# Patient Record
Sex: MALE
[De-identification: names, ages, dates, MRNs, and addresses within clinical notes are randomized per-mention and may not be internally consistent; named-entity substitution may affect disease eponyms.]

---

## 2019-11-05 ENCOUNTER — HOSPITAL ENCOUNTER (INPATIENT)
Dept: HOSPITAL 90 - EDH | Age: 84
LOS: 11 days | Discharge: HOSPICE | DRG: 208 | End: 2019-11-16
Attending: INTERNAL MEDICINE | Admitting: INTERNAL MEDICINE
Payer: COMMERCIAL

## 2019-11-05 VITALS — SYSTOLIC BLOOD PRESSURE: 120 MMHG | DIASTOLIC BLOOD PRESSURE: 79 MMHG

## 2019-11-05 VITALS — SYSTOLIC BLOOD PRESSURE: 110 MMHG | DIASTOLIC BLOOD PRESSURE: 68 MMHG

## 2019-11-05 VITALS — DIASTOLIC BLOOD PRESSURE: 94 MMHG | SYSTOLIC BLOOD PRESSURE: 157 MMHG

## 2019-11-05 VITALS — SYSTOLIC BLOOD PRESSURE: 150 MMHG | DIASTOLIC BLOOD PRESSURE: 89 MMHG

## 2019-11-05 VITALS — DIASTOLIC BLOOD PRESSURE: 76 MMHG | SYSTOLIC BLOOD PRESSURE: 139 MMHG

## 2019-11-05 VITALS — DIASTOLIC BLOOD PRESSURE: 87 MMHG | SYSTOLIC BLOOD PRESSURE: 148 MMHG

## 2019-11-05 VITALS — DIASTOLIC BLOOD PRESSURE: 82 MMHG | SYSTOLIC BLOOD PRESSURE: 132 MMHG

## 2019-11-05 VITALS — SYSTOLIC BLOOD PRESSURE: 148 MMHG | DIASTOLIC BLOOD PRESSURE: 80 MMHG

## 2019-11-05 VITALS — DIASTOLIC BLOOD PRESSURE: 62 MMHG | SYSTOLIC BLOOD PRESSURE: 120 MMHG

## 2019-11-05 VITALS — SYSTOLIC BLOOD PRESSURE: 80 MMHG | DIASTOLIC BLOOD PRESSURE: 46 MMHG

## 2019-11-05 VITALS — DIASTOLIC BLOOD PRESSURE: 91 MMHG | SYSTOLIC BLOOD PRESSURE: 142 MMHG

## 2019-11-05 VITALS — SYSTOLIC BLOOD PRESSURE: 163 MMHG | DIASTOLIC BLOOD PRESSURE: 56 MMHG

## 2019-11-05 VITALS — DIASTOLIC BLOOD PRESSURE: 94 MMHG | SYSTOLIC BLOOD PRESSURE: 155 MMHG

## 2019-11-05 VITALS — DIASTOLIC BLOOD PRESSURE: 94 MMHG | SYSTOLIC BLOOD PRESSURE: 170 MMHG

## 2019-11-05 VITALS — SYSTOLIC BLOOD PRESSURE: 145 MMHG | DIASTOLIC BLOOD PRESSURE: 85 MMHG

## 2019-11-05 VITALS — DIASTOLIC BLOOD PRESSURE: 83 MMHG | SYSTOLIC BLOOD PRESSURE: 138 MMHG

## 2019-11-05 VITALS — DIASTOLIC BLOOD PRESSURE: 86 MMHG | SYSTOLIC BLOOD PRESSURE: 154 MMHG

## 2019-11-05 VITALS — DIASTOLIC BLOOD PRESSURE: 76 MMHG | SYSTOLIC BLOOD PRESSURE: 128 MMHG

## 2019-11-05 VITALS — BODY MASS INDEX: 21.23 KG/M2 | HEIGHT: 68 IN | WEIGHT: 140.1 LBS

## 2019-11-05 VITALS — DIASTOLIC BLOOD PRESSURE: 82 MMHG | SYSTOLIC BLOOD PRESSURE: 143 MMHG

## 2019-11-05 VITALS — SYSTOLIC BLOOD PRESSURE: 178 MMHG | DIASTOLIC BLOOD PRESSURE: 100 MMHG

## 2019-11-05 VITALS — DIASTOLIC BLOOD PRESSURE: 48 MMHG | SYSTOLIC BLOOD PRESSURE: 84 MMHG

## 2019-11-05 VITALS — DIASTOLIC BLOOD PRESSURE: 95 MMHG | SYSTOLIC BLOOD PRESSURE: 161 MMHG

## 2019-11-05 VITALS — SYSTOLIC BLOOD PRESSURE: 119 MMHG | DIASTOLIC BLOOD PRESSURE: 67 MMHG

## 2019-11-05 VITALS — DIASTOLIC BLOOD PRESSURE: 71 MMHG | SYSTOLIC BLOOD PRESSURE: 130 MMHG

## 2019-11-05 VITALS — SYSTOLIC BLOOD PRESSURE: 80 MMHG | DIASTOLIC BLOOD PRESSURE: 52 MMHG

## 2019-11-05 VITALS — DIASTOLIC BLOOD PRESSURE: 75 MMHG | SYSTOLIC BLOOD PRESSURE: 126 MMHG

## 2019-11-05 VITALS — SYSTOLIC BLOOD PRESSURE: 138 MMHG | DIASTOLIC BLOOD PRESSURE: 76 MMHG

## 2019-11-05 VITALS — SYSTOLIC BLOOD PRESSURE: 128 MMHG | DIASTOLIC BLOOD PRESSURE: 77 MMHG

## 2019-11-05 VITALS — SYSTOLIC BLOOD PRESSURE: 130 MMHG | DIASTOLIC BLOOD PRESSURE: 71 MMHG

## 2019-11-05 VITALS — DIASTOLIC BLOOD PRESSURE: 94 MMHG | SYSTOLIC BLOOD PRESSURE: 165 MMHG

## 2019-11-05 VITALS — DIASTOLIC BLOOD PRESSURE: 47 MMHG | SYSTOLIC BLOOD PRESSURE: 82 MMHG

## 2019-11-05 VITALS — SYSTOLIC BLOOD PRESSURE: 158 MMHG | DIASTOLIC BLOOD PRESSURE: 84 MMHG

## 2019-11-05 VITALS — SYSTOLIC BLOOD PRESSURE: 120 MMHG | DIASTOLIC BLOOD PRESSURE: 82 MMHG

## 2019-11-05 VITALS — SYSTOLIC BLOOD PRESSURE: 79 MMHG | DIASTOLIC BLOOD PRESSURE: 48 MMHG

## 2019-11-05 VITALS — SYSTOLIC BLOOD PRESSURE: 158 MMHG | DIASTOLIC BLOOD PRESSURE: 85 MMHG

## 2019-11-05 DIAGNOSIS — J96.01: Primary | ICD-10-CM

## 2019-11-05 DIAGNOSIS — Z86.73: ICD-10-CM

## 2019-11-05 DIAGNOSIS — I48.91: ICD-10-CM

## 2019-11-05 DIAGNOSIS — I25.10: ICD-10-CM

## 2019-11-05 DIAGNOSIS — N17.9: ICD-10-CM

## 2019-11-05 DIAGNOSIS — K22.10: ICD-10-CM

## 2019-11-05 DIAGNOSIS — K92.0: ICD-10-CM

## 2019-11-05 DIAGNOSIS — D72.829: ICD-10-CM

## 2019-11-05 DIAGNOSIS — Z66: ICD-10-CM

## 2019-11-05 DIAGNOSIS — K59.00: ICD-10-CM

## 2019-11-05 DIAGNOSIS — Z79.82: ICD-10-CM

## 2019-11-05 DIAGNOSIS — Z82.49: ICD-10-CM

## 2019-11-05 DIAGNOSIS — I50.22: ICD-10-CM

## 2019-11-05 DIAGNOSIS — K29.80: ICD-10-CM

## 2019-11-05 DIAGNOSIS — D62: ICD-10-CM

## 2019-11-05 DIAGNOSIS — E78.5: ICD-10-CM

## 2019-11-05 DIAGNOSIS — K55.20: ICD-10-CM

## 2019-11-05 DIAGNOSIS — D69.6: ICD-10-CM

## 2019-11-05 DIAGNOSIS — Z51.5: ICD-10-CM

## 2019-11-05 DIAGNOSIS — D49.0: ICD-10-CM

## 2019-11-05 DIAGNOSIS — N18.3: ICD-10-CM

## 2019-11-05 DIAGNOSIS — J44.9: ICD-10-CM

## 2019-11-05 DIAGNOSIS — C15.9: ICD-10-CM

## 2019-11-05 DIAGNOSIS — I13.0: ICD-10-CM

## 2019-11-05 DIAGNOSIS — C79.9: ICD-10-CM

## 2019-11-05 DIAGNOSIS — R62.7: ICD-10-CM

## 2019-11-05 DIAGNOSIS — E86.9: ICD-10-CM

## 2019-11-05 DIAGNOSIS — K29.00: ICD-10-CM

## 2019-11-05 DIAGNOSIS — K29.70: ICD-10-CM

## 2019-11-05 DIAGNOSIS — F41.9: ICD-10-CM

## 2019-11-05 LAB
ALBUMIN SERPL-MCNC: 2.5 G/DL (ref 3.5–5)
ALBUMIN SERPL-MCNC: 2.9 G/DL (ref 3.5–5)
ALT SERPL-CCNC: 11 U/L (ref 12–78)
ALT SERPL-CCNC: 13 U/L (ref 12–78)
APTT PPP: 29.5 SEC (ref 26.3–35.5)
AST SERPL-CCNC: 16 U/L (ref 10–37)
AST SERPL-CCNC: 17 U/L (ref 10–37)
BASE EXCESS BLDA CALC-SCNC: -8 MMOL/L (ref -2–3)
BASOPHILS NFR BLD AUTO: 0.5 % (ref 0–5)
BILIRUB DIRECT SERPL-MCNC: 0.1 MG/DL (ref 0–0.3)
BILIRUB SERPL-MCNC: 0.5 MG/DL (ref 0.2–1)
BILIRUB SERPL-MCNC: 0.5 MG/DL (ref 0.2–1)
BNP SERPL-MCNC: 138 PG/ML (ref 0–100)
BUN SERPL-MCNC: 44 MG/DL (ref 7–18)
BUN SERPL-MCNC: 45 MG/DL (ref 7–18)
CHLORIDE SERPL-SCNC: 108 MMOL/L (ref 101–111)
CHLORIDE SERPL-SCNC: 109 MMOL/L (ref 101–111)
CK SERPL-CCNC: 45 U/L (ref 21–232)
CO2 SERPL-SCNC: 21 MMOL/L (ref 21–32)
CO2 SERPL-SCNC: 22 MMOL/L (ref 21–32)
CREAT SERPL-MCNC: 1.8 MG/DL (ref 0.5–1.5)
CREAT SERPL-MCNC: 1.9 MG/DL (ref 0.5–1.5)
EOSINOPHIL NFR BLD AUTO: 0.8 % (ref 0–8)
ERYTHROCYTE [DISTWIDTH] IN BLOOD BY AUTOMATED COUNT: 14.8 % (ref 11–15.5)
ERYTHROCYTE [DISTWIDTH] IN BLOOD BY AUTOMATED COUNT: 15.1 % (ref 11–15.5)
GFR SERPL CREATININE-BSD FRML MDRD: 36 ML/MIN (ref 60–?)
GFR SERPL CREATININE-BSD FRML MDRD: 38 ML/MIN (ref 60–?)
GLUCOSE SERPL-MCNC: 104 MG/DL (ref 70–105)
GLUCOSE SERPL-MCNC: 135 MG/DL (ref 70–105)
HCO3 BLDA-SCNC: 15.6 MMOL/L (ref 21–28)
HCT VFR BLD AUTO: 36.3 % (ref 42–54)
HCT VFR BLD AUTO: 37.8 % (ref 42–54)
INR PPP: 1.16 (ref 0.85–1.15)
LYMPHOCYTES NFR SPEC AUTO: 31.4 % (ref 21–51)
MCH RBC QN AUTO: 28.5 PG (ref 27–33)
MCH RBC QN AUTO: 28.5 PG (ref 27–33)
MCHC RBC AUTO-ENTMCNC: 33.5 G/DL (ref 32–36)
MCHC RBC AUTO-ENTMCNC: 33.6 G/DL (ref 32–36)
MCV RBC AUTO: 84.9 FL (ref 79–99)
MCV RBC AUTO: 85 FL (ref 79–99)
MONOCYTES NFR BLD AUTO: 8.2 % (ref 3–13)
NEUTROPHILS NFR BLD AUTO: 59.1 % (ref 40–77)
NRBC BLD MANUAL-RTO: 0 % (ref 0–0.19)
NRBC BLD MANUAL-RTO: 0.1 % (ref 0–0.19)
PCO2 BLDA: 27 MMHG (ref 35–48)
PLATELET # BLD AUTO: 191 K/UL (ref 130–400)
PLATELET # BLD AUTO: 211 K/UL (ref 130–400)
POTASSIUM SERPL-SCNC: 4 MMOL/L (ref 3.5–5.1)
POTASSIUM SERPL-SCNC: 4 MMOL/L (ref 3.5–5.1)
PROT SERPL-MCNC: 5.8 G/DL (ref 6–8.3)
PROT SERPL-MCNC: 6.3 G/DL (ref 6–8.3)
PROTHROMBIN TIME: 12.1 SEC (ref 9.6–11.6)
RBC # BLD AUTO: 4.28 MIL/UL (ref 4.5–6.2)
RBC # BLD AUTO: 4.44 MIL/UL (ref 4.5–6.2)
SAO2 % BLDA: 99 % (ref 95–99)
SODIUM SERPL-SCNC: 139 MMOL/L (ref 136–145)
SODIUM SERPL-SCNC: 141 MMOL/L (ref 136–145)
WBC # BLD AUTO: 12.6 K/UL (ref 4.8–10.8)
WBC # BLD AUTO: 13.3 K/UL (ref 4.8–10.8)

## 2019-11-05 PROCEDURE — 36600 WITHDRAWAL OF ARTERIAL BLOOD: CPT

## 2019-11-05 PROCEDURE — 86900 BLOOD TYPING SEROLOGIC ABO: CPT

## 2019-11-05 PROCEDURE — 36251 INS CATH REN ART 1ST UNILAT: CPT

## 2019-11-05 PROCEDURE — 85027 COMPLETE CBC AUTOMATED: CPT

## 2019-11-05 PROCEDURE — 92610 EVALUATE SWALLOWING FUNCTION: CPT

## 2019-11-05 PROCEDURE — 71250 CT THORAX DX C-: CPT

## 2019-11-05 PROCEDURE — 80048 BASIC METABOLIC PNL TOTAL CA: CPT

## 2019-11-05 PROCEDURE — 83605 ASSAY OF LACTIC ACID: CPT

## 2019-11-05 PROCEDURE — 84132 ASSAY OF SERUM POTASSIUM: CPT

## 2019-11-05 PROCEDURE — 93005 ELECTROCARDIOGRAM TRACING: CPT

## 2019-11-05 PROCEDURE — 82435 ASSAY OF BLOOD CHLORIDE: CPT

## 2019-11-05 PROCEDURE — 83880 ASSAY OF NATRIURETIC PEPTIDE: CPT

## 2019-11-05 PROCEDURE — 87804 INFLUENZA ASSAY W/OPTIC: CPT

## 2019-11-05 PROCEDURE — 80053 COMPREHEN METABOLIC PANEL: CPT

## 2019-11-05 PROCEDURE — 97039 UNLISTED MODALITY: CPT

## 2019-11-05 PROCEDURE — B4141ZZ FLUOROSCOPY OF SUPERIOR MESENTERIC ARTERY USING LOW OSMOLAR CONTRAST: ICD-10-PCS | Performed by: RADIOLOGY

## 2019-11-05 PROCEDURE — 36245 INS CATH ABD/L-EXT ART 1ST: CPT

## 2019-11-05 PROCEDURE — 84295 ASSAY OF SERUM SODIUM: CPT

## 2019-11-05 PROCEDURE — 94664 DEMO&/EVAL PT USE INHALER: CPT

## 2019-11-05 PROCEDURE — 75726 ARTERY X-RAYS ABDOMEN: CPT

## 2019-11-05 PROCEDURE — B4101ZZ FLUOROSCOPY OF ABDOMINAL AORTA USING LOW OSMOLAR CONTRAST: ICD-10-PCS | Performed by: RADIOLOGY

## 2019-11-05 PROCEDURE — 82378 CARCINOEMBRYONIC ANTIGEN: CPT

## 2019-11-05 PROCEDURE — 85025 COMPLETE CBC W/AUTO DIFF WBC: CPT

## 2019-11-05 PROCEDURE — 0BH17EZ INSERTION OF ENDOTRACHEAL AIRWAY INTO TRACHEA, VIA NATURAL OR ARTIFICIAL OPENING: ICD-10-PCS | Performed by: INTERNAL MEDICINE

## 2019-11-05 PROCEDURE — 0DB38ZX EXCISION OF LOWER ESOPHAGUS, VIA NATURAL OR ARTIFICIAL OPENING ENDOSCOPIC, DIAGNOSTIC: ICD-10-PCS | Performed by: INTERNAL MEDICINE

## 2019-11-05 PROCEDURE — 86901 BLOOD TYPING SEROLOGIC RH(D): CPT

## 2019-11-05 PROCEDURE — 86850 RBC ANTIBODY SCREEN: CPT

## 2019-11-05 PROCEDURE — 84484 ASSAY OF TROPONIN QUANT: CPT

## 2019-11-05 PROCEDURE — 82948 REAGENT STRIP/BLOOD GLUCOSE: CPT

## 2019-11-05 PROCEDURE — 83735 ASSAY OF MAGNESIUM: CPT

## 2019-11-05 PROCEDURE — 82947 ASSAY GLUCOSE BLOOD QUANT: CPT

## 2019-11-05 PROCEDURE — 85730 THROMBOPLASTIN TIME PARTIAL: CPT

## 2019-11-05 PROCEDURE — 43239 EGD BIOPSY SINGLE/MULTIPLE: CPT

## 2019-11-05 PROCEDURE — 85610 PROTHROMBIN TIME: CPT

## 2019-11-05 PROCEDURE — 82803 BLOOD GASES ANY COMBINATION: CPT

## 2019-11-05 PROCEDURE — 94003 VENT MGMT INPAT SUBQ DAY: CPT

## 2019-11-05 PROCEDURE — 94002 VENT MGMT INPAT INIT DAY: CPT

## 2019-11-05 PROCEDURE — 0DC38ZZ EXTIRPATION OF MATTER FROM LOWER ESOPHAGUS, VIA NATURAL OR ARTIFICIAL OPENING ENDOSCOPIC: ICD-10-PCS | Performed by: INTERNAL MEDICINE

## 2019-11-05 PROCEDURE — 80076 HEPATIC FUNCTION PANEL: CPT

## 2019-11-05 PROCEDURE — 36415 COLL VENOUS BLD VENIPUNCTURE: CPT

## 2019-11-05 PROCEDURE — 82550 ASSAY OF CK (CPK): CPT

## 2019-11-05 PROCEDURE — 85018 HEMOGLOBIN: CPT

## 2019-11-05 PROCEDURE — 99291 CRITICAL CARE FIRST HOUR: CPT

## 2019-11-05 PROCEDURE — 94640 AIRWAY INHALATION TREATMENT: CPT

## 2019-11-05 PROCEDURE — 71045 X-RAY EXAM CHEST 1 VIEW: CPT

## 2019-11-05 PROCEDURE — 88305 TISSUE EXAM BY PATHOLOGIST: CPT

## 2019-11-05 PROCEDURE — 5A1945Z RESPIRATORY VENTILATION, 24-96 CONSECUTIVE HOURS: ICD-10-PCS | Performed by: INTERNAL MEDICINE

## 2019-11-05 PROCEDURE — 92526 ORAL FUNCTION THERAPY: CPT

## 2019-11-05 PROCEDURE — 94660 CPAP INITIATION&MGMT: CPT

## 2019-11-05 RX ADMIN — IPRATROPIUM BROMIDE AND ALBUTEROL SULFATE SCH UDVIAL: .5; 3 SOLUTION RESPIRATORY (INHALATION) at 23:26

## 2019-11-05 RX ADMIN — Medication PRN MG: at 23:02

## 2019-11-05 RX ADMIN — SODIUM CHLORIDE SCH MLS/HR: 0.9 INJECTION, SOLUTION INTRAVENOUS at 21:57

## 2019-11-05 NOTE — NUR
PATIENT ARRIVED TO ROOM 210 FROM CATH LAB POST BLEEDING ESOPHAGEAL MASS EMBOLIZATION. AS PER 
REPORT, NO SOURCE OF BLEEDING WAS FOUND, NO EMBOLIZATION PERFORMED. PATIENT WAS STARTED ON 
NITROGLYCERIN DRIP  DURING PROCEDURE. AT THIS TIME BP /71, HR 94 NSR. 

PATIENT CAME INTO THE ER EARLIER TODAY WITH C/O N/V WITH BLOOD NOTED 1 HR PRIOR TO ARRIVAL 
TO ER. PATIENT WAS SENT FOR EGD WITH MAC WITH DR. SHAH. FINDINGS INCLUDED OF ESOPHAGEAL 
BLEEDING MASS. DR. SHAH SPOKE WITH DR. BERMUDEZ ABOUT CASE. AT THIS TIME, NO INTERVENTION 
FROM DR. BERMUDEZ. PT WAS THEN SENT TO CATH LAB. 

 I CALLED DR. SHAH IN REFERENCE TO ORDER FOR NM BLEEDING SCAN. I UPDATED HIM ON CATH LAB 
FINDINGS. MD INSTRUCTED TO NOTIFY DR. BERMUDEZ. 

 WILL CONT TO MONITOR PATIENT CLOSELY.

-------------------------------------------------------------------------------

Addendum: 11/05/19 at 1833 by MANDY THOMAS RN RN

-------------------------------------------------------------------------------

 AS PER SHARONDA BANKS, DR. VALDEZ SPOKE WITH FAMILY. NO FAMILY PRESENT AT THIS TIME.

## 2019-11-05 NOTE — NUR
DIPRIVAN STARTED BY TURNER GOVEA AT 30MCG.  

-------------------------------------------------------------------------------

Addendum: 11/05/19 at 1641 by SLADE ZENDEJAS RN RN

-------------------------------------------------------------------------------

Amended: Links added.

## 2019-11-05 NOTE — NUR
CATH LAB RHODA TO SEE PT, AND PT PREPARED FOR CATH LAB.  JOCELYN PARDO AND RO MARIANO SPOKE WITH 
WIFE, ANSWERED HER QUESTIONS AND CONSENT WAS OBTAINED. PT'S VITAL SIGNS STABLE WITH MAYKEL DRIP 
AND SEDATED WITH DIPRIVAN.  PT ON VENT, TOLERATING VENT SETTINGS.  PT TRANSFERRED TO CATH 
LAB WITH RESPIRATORY THERAPIST.  PT APPEARED STABLE.  

-------------------------------------------------------------------------------

Addendum: 11/05/19 at 1651 by SLADE ZENDEJAS RN RN

-------------------------------------------------------------------------------

Amended: Links added.

## 2019-11-05 NOTE — NUR
NEOSYNEPHRINE DRIP STARTED BY TURNER GOVEA, 20MCG. 

-------------------------------------------------------------------------------

Addendum: 11/05/19 at 1644 by SLADE ZENDEJAS RN RN

-------------------------------------------------------------------------------

Amended: Links added.

## 2019-11-06 VITALS — SYSTOLIC BLOOD PRESSURE: 152 MMHG | DIASTOLIC BLOOD PRESSURE: 83 MMHG

## 2019-11-06 VITALS — DIASTOLIC BLOOD PRESSURE: 60 MMHG | SYSTOLIC BLOOD PRESSURE: 125 MMHG

## 2019-11-06 VITALS — DIASTOLIC BLOOD PRESSURE: 73 MMHG | SYSTOLIC BLOOD PRESSURE: 123 MMHG

## 2019-11-06 VITALS — DIASTOLIC BLOOD PRESSURE: 61 MMHG | SYSTOLIC BLOOD PRESSURE: 128 MMHG

## 2019-11-06 VITALS — SYSTOLIC BLOOD PRESSURE: 158 MMHG | DIASTOLIC BLOOD PRESSURE: 83 MMHG

## 2019-11-06 VITALS — DIASTOLIC BLOOD PRESSURE: 56 MMHG | SYSTOLIC BLOOD PRESSURE: 119 MMHG

## 2019-11-06 VITALS — DIASTOLIC BLOOD PRESSURE: 70 MMHG | SYSTOLIC BLOOD PRESSURE: 125 MMHG

## 2019-11-06 VITALS — SYSTOLIC BLOOD PRESSURE: 128 MMHG | DIASTOLIC BLOOD PRESSURE: 66 MMHG

## 2019-11-06 VITALS — SYSTOLIC BLOOD PRESSURE: 140 MMHG | DIASTOLIC BLOOD PRESSURE: 75 MMHG

## 2019-11-06 VITALS — SYSTOLIC BLOOD PRESSURE: 146 MMHG | DIASTOLIC BLOOD PRESSURE: 73 MMHG

## 2019-11-06 VITALS — DIASTOLIC BLOOD PRESSURE: 73 MMHG | SYSTOLIC BLOOD PRESSURE: 155 MMHG

## 2019-11-06 VITALS — DIASTOLIC BLOOD PRESSURE: 62 MMHG | SYSTOLIC BLOOD PRESSURE: 124 MMHG

## 2019-11-06 VITALS — DIASTOLIC BLOOD PRESSURE: 89 MMHG | SYSTOLIC BLOOD PRESSURE: 160 MMHG

## 2019-11-06 VITALS — DIASTOLIC BLOOD PRESSURE: 60 MMHG | SYSTOLIC BLOOD PRESSURE: 121 MMHG

## 2019-11-06 VITALS — SYSTOLIC BLOOD PRESSURE: 122 MMHG | DIASTOLIC BLOOD PRESSURE: 56 MMHG

## 2019-11-06 VITALS — DIASTOLIC BLOOD PRESSURE: 72 MMHG | SYSTOLIC BLOOD PRESSURE: 139 MMHG

## 2019-11-06 VITALS — DIASTOLIC BLOOD PRESSURE: 68 MMHG | SYSTOLIC BLOOD PRESSURE: 122 MMHG

## 2019-11-06 VITALS — DIASTOLIC BLOOD PRESSURE: 62 MMHG | SYSTOLIC BLOOD PRESSURE: 118 MMHG

## 2019-11-06 VITALS — DIASTOLIC BLOOD PRESSURE: 60 MMHG | SYSTOLIC BLOOD PRESSURE: 133 MMHG

## 2019-11-06 VITALS — DIASTOLIC BLOOD PRESSURE: 114 MMHG | SYSTOLIC BLOOD PRESSURE: 156 MMHG

## 2019-11-06 VITALS — DIASTOLIC BLOOD PRESSURE: 62 MMHG | SYSTOLIC BLOOD PRESSURE: 115 MMHG

## 2019-11-06 VITALS — DIASTOLIC BLOOD PRESSURE: 70 MMHG | SYSTOLIC BLOOD PRESSURE: 133 MMHG

## 2019-11-06 VITALS — SYSTOLIC BLOOD PRESSURE: 128 MMHG | DIASTOLIC BLOOD PRESSURE: 64 MMHG

## 2019-11-06 VITALS — DIASTOLIC BLOOD PRESSURE: 86 MMHG | SYSTOLIC BLOOD PRESSURE: 136 MMHG

## 2019-11-06 VITALS — SYSTOLIC BLOOD PRESSURE: 124 MMHG | DIASTOLIC BLOOD PRESSURE: 66 MMHG

## 2019-11-06 VITALS — DIASTOLIC BLOOD PRESSURE: 68 MMHG | SYSTOLIC BLOOD PRESSURE: 123 MMHG

## 2019-11-06 VITALS — DIASTOLIC BLOOD PRESSURE: 58 MMHG | SYSTOLIC BLOOD PRESSURE: 124 MMHG

## 2019-11-06 VITALS — SYSTOLIC BLOOD PRESSURE: 126 MMHG | DIASTOLIC BLOOD PRESSURE: 71 MMHG

## 2019-11-06 VITALS — SYSTOLIC BLOOD PRESSURE: 180 MMHG | DIASTOLIC BLOOD PRESSURE: 85 MMHG

## 2019-11-06 VITALS — DIASTOLIC BLOOD PRESSURE: 59 MMHG | SYSTOLIC BLOOD PRESSURE: 117 MMHG

## 2019-11-06 VITALS — DIASTOLIC BLOOD PRESSURE: 64 MMHG | SYSTOLIC BLOOD PRESSURE: 132 MMHG

## 2019-11-06 VITALS — DIASTOLIC BLOOD PRESSURE: 80 MMHG | SYSTOLIC BLOOD PRESSURE: 149 MMHG

## 2019-11-06 VITALS — DIASTOLIC BLOOD PRESSURE: 86 MMHG | SYSTOLIC BLOOD PRESSURE: 183 MMHG

## 2019-11-06 VITALS — SYSTOLIC BLOOD PRESSURE: 124 MMHG | DIASTOLIC BLOOD PRESSURE: 80 MMHG

## 2019-11-06 VITALS — DIASTOLIC BLOOD PRESSURE: 53 MMHG | SYSTOLIC BLOOD PRESSURE: 125 MMHG

## 2019-11-06 VITALS — DIASTOLIC BLOOD PRESSURE: 53 MMHG | SYSTOLIC BLOOD PRESSURE: 118 MMHG

## 2019-11-06 VITALS — DIASTOLIC BLOOD PRESSURE: 59 MMHG | SYSTOLIC BLOOD PRESSURE: 116 MMHG

## 2019-11-06 VITALS — DIASTOLIC BLOOD PRESSURE: 61 MMHG | SYSTOLIC BLOOD PRESSURE: 108 MMHG

## 2019-11-06 VITALS — SYSTOLIC BLOOD PRESSURE: 134 MMHG | DIASTOLIC BLOOD PRESSURE: 63 MMHG

## 2019-11-06 VITALS — DIASTOLIC BLOOD PRESSURE: 65 MMHG | SYSTOLIC BLOOD PRESSURE: 122 MMHG

## 2019-11-06 VITALS — DIASTOLIC BLOOD PRESSURE: 82 MMHG | SYSTOLIC BLOOD PRESSURE: 139 MMHG

## 2019-11-06 VITALS — SYSTOLIC BLOOD PRESSURE: 145 MMHG | DIASTOLIC BLOOD PRESSURE: 71 MMHG

## 2019-11-06 VITALS — SYSTOLIC BLOOD PRESSURE: 122 MMHG | DIASTOLIC BLOOD PRESSURE: 72 MMHG

## 2019-11-06 VITALS — DIASTOLIC BLOOD PRESSURE: 71 MMHG | SYSTOLIC BLOOD PRESSURE: 122 MMHG

## 2019-11-06 VITALS — DIASTOLIC BLOOD PRESSURE: 70 MMHG | SYSTOLIC BLOOD PRESSURE: 144 MMHG

## 2019-11-06 VITALS — SYSTOLIC BLOOD PRESSURE: 144 MMHG | DIASTOLIC BLOOD PRESSURE: 62 MMHG

## 2019-11-06 LAB
BASOPHILS NFR BLD AUTO: 0.3 % (ref 0–5)
BUN SERPL-MCNC: 39 MG/DL (ref 7–18)
CHLORIDE SERPL-SCNC: 111 MMOL/L (ref 101–111)
CO2 SERPL-SCNC: 20 MMOL/L (ref 21–32)
CREAT SERPL-MCNC: 1.7 MG/DL (ref 0.5–1.5)
EOSINOPHIL NFR BLD AUTO: 0 % (ref 0–8)
ERYTHROCYTE [DISTWIDTH] IN BLOOD BY AUTOMATED COUNT: 15.2 % (ref 11–15.5)
GFR SERPL CREATININE-BSD FRML MDRD: 41 ML/MIN (ref 60–?)
GLUCOSE SERPL-MCNC: 142 MG/DL (ref 70–105)
HCT VFR BLD AUTO: 33.6 % (ref 42–54)
LYMPHOCYTES NFR SPEC AUTO: 10.7 % (ref 21–51)
MCH RBC QN AUTO: 29.2 PG (ref 27–33)
MCHC RBC AUTO-ENTMCNC: 34 G/DL (ref 32–36)
MCV RBC AUTO: 86 FL (ref 79–99)
MONOCYTES NFR BLD AUTO: 8.5 % (ref 3–13)
NEUTROPHILS NFR BLD AUTO: 80.5 % (ref 40–77)
NRBC BLD MANUAL-RTO: 0 % (ref 0–0.19)
PLATELET # BLD AUTO: 156 K/UL (ref 130–400)
POTASSIUM SERPL-SCNC: 4 MMOL/L (ref 3.5–5.1)
RBC # BLD AUTO: 3.91 MIL/UL (ref 4.5–6.2)
SODIUM SERPL-SCNC: 142 MMOL/L (ref 136–145)
WBC # BLD AUTO: 15.1 K/UL (ref 4.8–10.8)

## 2019-11-06 RX ADMIN — Medication PRN MG: at 17:43

## 2019-11-06 RX ADMIN — SODIUM CHLORIDE SCH MLS/HR: 0.9 INJECTION, SOLUTION INTRAVENOUS at 13:52

## 2019-11-06 RX ADMIN — IPRATROPIUM BROMIDE AND ALBUTEROL SULFATE SCH UDVIAL: .5; 3 SOLUTION RESPIRATORY (INHALATION) at 17:15

## 2019-11-06 RX ADMIN — Medication PRN MG: at 08:10

## 2019-11-06 RX ADMIN — FAMOTIDINE SCH MG: 10 INJECTION INTRAVENOUS at 08:09

## 2019-11-06 RX ADMIN — IPRATROPIUM BROMIDE AND ALBUTEROL SULFATE SCH UDVIAL: .5; 3 SOLUTION RESPIRATORY (INHALATION) at 12:00

## 2019-11-06 RX ADMIN — SODIUM CHLORIDE SCH MLS/HR: 9 INJECTION, SOLUTION INTRAVENOUS at 13:23

## 2019-11-06 RX ADMIN — IPRATROPIUM BROMIDE AND ALBUTEROL SULFATE SCH UDVIAL: .5; 3 SOLUTION RESPIRATORY (INHALATION) at 06:00

## 2019-11-06 RX ADMIN — IPRATROPIUM BROMIDE AND ALBUTEROL SULFATE SCH UDVIAL: .5; 3 SOLUTION RESPIRATORY (INHALATION) at 23:24

## 2019-11-06 RX ADMIN — SODIUM CHLORIDE SCH MLS/HR: 9 INJECTION, SOLUTION INTRAVENOUS at 23:23

## 2019-11-06 RX ADMIN — Medication PRN MG: at 03:00

## 2019-11-06 RX ADMIN — Medication PRN MG: at 13:21

## 2019-11-06 NOTE — NUR
DR. SAHA AND DARIN ARCINIEGA PA HERE AND ASSESSED PT AND SPOKE WITH WIFE OF POSSIBLE PALLIATIVE 
CARE AND COMFORT CARE. WIFE STATED THAT SHE WILL DISCUSS IT WITH HER SONS AND HAVE AN ANSWER 
IN THE MORNING.

## 2019-11-06 NOTE — NUR
JONNY PLAN



VISITED WITH PATIENT. PATIENT VENTED. NO FAMILY AT BEDSIDE. GIANNI WILL CONTINUE TO FOLLOW. 

-------------------------------------------------------------------------------

Addendum: 11/06/19 at 1438 by PLOY TRUJILLO RN CM

-------------------------------------------------------------------------------

Amended: Links added.

## 2019-11-07 VITALS — DIASTOLIC BLOOD PRESSURE: 55 MMHG | SYSTOLIC BLOOD PRESSURE: 118 MMHG

## 2019-11-07 VITALS — DIASTOLIC BLOOD PRESSURE: 66 MMHG | SYSTOLIC BLOOD PRESSURE: 152 MMHG

## 2019-11-07 VITALS — DIASTOLIC BLOOD PRESSURE: 64 MMHG | SYSTOLIC BLOOD PRESSURE: 131 MMHG

## 2019-11-07 VITALS — SYSTOLIC BLOOD PRESSURE: 107 MMHG | DIASTOLIC BLOOD PRESSURE: 55 MMHG

## 2019-11-07 VITALS — SYSTOLIC BLOOD PRESSURE: 125 MMHG | DIASTOLIC BLOOD PRESSURE: 60 MMHG

## 2019-11-07 VITALS — DIASTOLIC BLOOD PRESSURE: 50 MMHG | SYSTOLIC BLOOD PRESSURE: 124 MMHG

## 2019-11-07 VITALS — DIASTOLIC BLOOD PRESSURE: 66 MMHG | SYSTOLIC BLOOD PRESSURE: 134 MMHG

## 2019-11-07 VITALS — SYSTOLIC BLOOD PRESSURE: 115 MMHG | DIASTOLIC BLOOD PRESSURE: 56 MMHG

## 2019-11-07 VITALS — DIASTOLIC BLOOD PRESSURE: 57 MMHG | SYSTOLIC BLOOD PRESSURE: 117 MMHG

## 2019-11-07 VITALS — DIASTOLIC BLOOD PRESSURE: 53 MMHG | SYSTOLIC BLOOD PRESSURE: 112 MMHG

## 2019-11-07 VITALS — SYSTOLIC BLOOD PRESSURE: 121 MMHG | DIASTOLIC BLOOD PRESSURE: 53 MMHG

## 2019-11-07 VITALS — DIASTOLIC BLOOD PRESSURE: 48 MMHG | SYSTOLIC BLOOD PRESSURE: 106 MMHG

## 2019-11-07 VITALS — DIASTOLIC BLOOD PRESSURE: 57 MMHG | SYSTOLIC BLOOD PRESSURE: 127 MMHG

## 2019-11-07 VITALS — SYSTOLIC BLOOD PRESSURE: 111 MMHG | DIASTOLIC BLOOD PRESSURE: 53 MMHG

## 2019-11-07 VITALS — SYSTOLIC BLOOD PRESSURE: 116 MMHG | DIASTOLIC BLOOD PRESSURE: 63 MMHG

## 2019-11-07 VITALS — DIASTOLIC BLOOD PRESSURE: 50 MMHG | SYSTOLIC BLOOD PRESSURE: 129 MMHG

## 2019-11-07 VITALS — SYSTOLIC BLOOD PRESSURE: 112 MMHG | DIASTOLIC BLOOD PRESSURE: 50 MMHG

## 2019-11-07 VITALS — SYSTOLIC BLOOD PRESSURE: 109 MMHG | DIASTOLIC BLOOD PRESSURE: 58 MMHG

## 2019-11-07 VITALS — DIASTOLIC BLOOD PRESSURE: 61 MMHG | SYSTOLIC BLOOD PRESSURE: 123 MMHG

## 2019-11-07 LAB
BASOPHILS NFR BLD AUTO: 0.4 % (ref 0–5)
BUN SERPL-MCNC: 40 MG/DL (ref 7–18)
CHLORIDE SERPL-SCNC: 111 MMOL/L (ref 101–111)
CO2 SERPL-SCNC: 19 MMOL/L (ref 21–32)
CREAT SERPL-MCNC: 2.4 MG/DL (ref 0.5–1.5)
EOSINOPHIL NFR BLD AUTO: 0.3 % (ref 0–8)
ERYTHROCYTE [DISTWIDTH] IN BLOOD BY AUTOMATED COUNT: 15.5 % (ref 11–15.5)
GFR SERPL CREATININE-BSD FRML MDRD: 27 ML/MIN (ref 60–?)
GLUCOSE SERPL-MCNC: 139 MG/DL (ref 70–105)
HCT VFR BLD AUTO: 28.8 % (ref 42–54)
LYMPHOCYTES NFR SPEC AUTO: 10.6 % (ref 21–51)
MAGNESIUM SERPL-MCNC: 1.9 MG/DL (ref 1.8–2.4)
MCH RBC QN AUTO: 29.5 PG (ref 27–33)
MCHC RBC AUTO-ENTMCNC: 34.1 G/DL (ref 32–36)
MCV RBC AUTO: 86.5 FL (ref 79–99)
MONOCYTES NFR BLD AUTO: 9.1 % (ref 3–13)
NEUTROPHILS NFR BLD AUTO: 79.6 % (ref 40–77)
NRBC BLD MANUAL-RTO: 0 % (ref 0–0.19)
PLATELET # BLD AUTO: 131 K/UL (ref 130–400)
POTASSIUM SERPL-SCNC: 4 MMOL/L (ref 3.5–5.1)
RBC # BLD AUTO: 3.33 MIL/UL (ref 4.5–6.2)
SODIUM SERPL-SCNC: 142 MMOL/L (ref 136–145)
WBC # BLD AUTO: 12.9 K/UL (ref 4.8–10.8)

## 2019-11-07 RX ADMIN — IPRATROPIUM BROMIDE AND ALBUTEROL SULFATE SCH UDVIAL: .5; 3 SOLUTION RESPIRATORY (INHALATION) at 18:26

## 2019-11-07 RX ADMIN — IPRATROPIUM BROMIDE AND ALBUTEROL SULFATE SCH UDVIAL: .5; 3 SOLUTION RESPIRATORY (INHALATION) at 06:36

## 2019-11-07 RX ADMIN — Medication PRN MG: at 08:03

## 2019-11-07 RX ADMIN — SODIUM CHLORIDE SCH MLS/HR: 9 INJECTION, SOLUTION INTRAVENOUS at 22:13

## 2019-11-07 RX ADMIN — Medication PRN MG: at 03:30

## 2019-11-07 RX ADMIN — MORPHINE SULFATE PRN MG: 2 INJECTION, SOLUTION INTRAMUSCULAR; INTRAVENOUS at 16:12

## 2019-11-07 RX ADMIN — Medication PRN MG: at 17:40

## 2019-11-07 RX ADMIN — Medication PRN MG: at 13:00

## 2019-11-07 RX ADMIN — MORPHINE SULFATE PRN MG: 2 INJECTION, SOLUTION INTRAMUSCULAR; INTRAVENOUS at 00:01

## 2019-11-07 RX ADMIN — IPRATROPIUM BROMIDE AND ALBUTEROL SULFATE SCH UDVIAL: .5; 3 SOLUTION RESPIRATORY (INHALATION) at 18:27

## 2019-11-07 RX ADMIN — Medication PRN MG: at 00:53

## 2019-11-07 RX ADMIN — Medication PRN MG: at 22:14

## 2019-11-07 RX ADMIN — IPRATROPIUM BROMIDE AND ALBUTEROL SULFATE SCH UDVIAL: .5; 3 SOLUTION RESPIRATORY (INHALATION) at 12:06

## 2019-11-07 RX ADMIN — SODIUM CHLORIDE SCH MLS/HR: 0.9 INJECTION, SOLUTION INTRAVENOUS at 04:52

## 2019-11-07 RX ADMIN — MORPHINE SULFATE PRN MG: 2 INJECTION, SOLUTION INTRAMUSCULAR; INTRAVENOUS at 08:47

## 2019-11-07 RX ADMIN — FAMOTIDINE SCH MG: 10 INJECTION INTRAVENOUS at 13:00

## 2019-11-07 RX ADMIN — IPRATROPIUM BROMIDE AND ALBUTEROL SULFATE SCH UDVIAL: .5; 3 SOLUTION RESPIRATORY (INHALATION) at 23:48

## 2019-11-07 NOTE — NUR
EMOTIONAL SUPPORT/ PALLIATIVE CRE CONSULT



Sw met with pt's wife and daughter. Family states Dr Root told them "we need to wait for 
results of biopsy to see where we go from here". Wife states pt tyld her he did not want to 
be on artificial life support if it ever came to that, let him go. Wife states family is 
struggling on what do if it is cancer and if is not. Family considering withdrawal and 
hospice, but wife can not care for him at home, hospice homes are too far for her, and pt 
would not have wanted NH. Discussed Inpt hospice, but stressed that it is not long term. If 
pt is stable enough to be moved, a plan B would need to be in place. Pt's 3 children live 
out of [place], and wife is elderly and can't care for pt alone. 

Discussed decisions if pt does have cancer - would pt want treatment at 88, would he want 
PEG tube if needed. Wife says no. Discussed DNR. Sw educated on DNR, natural death vs 
withdrawal. Daughter states family would want Full code, wife says pt would not want. Wife 
wants to discuss with children, considering making him DNR. SW educated on Dr Springer and 
family would very much like to meet with Dr Springer  regarding options. Dr Springer not 
available until tomorrow am. Sw to notify family of time so they can all be present.

NP Cristiane aware and gave order for Dr Springer to nurse

## 2019-11-07 NOTE — NUR
DR CHAU 8am tomorrow



Zora recd call from Dr Chau. He can meet with family at 8am tomorrow. Sw informed wife and 
daughter and family will be here at that time. Nurse Ángel Llanos aware.

## 2019-11-07 NOTE — NUR
JONNY PLAN



 VISITING WITH PATIENT AND FAMILY. PROGNOSIS POOR. CM WILL CONTINUE TO FOLLOW.

-------------------------------------------------------------------------------

Addendum: 11/07/19 at 1337 by POLY TRUJILLO RN CM

-------------------------------------------------------------------------------

Amended: Links added.

## 2019-11-08 VITALS — SYSTOLIC BLOOD PRESSURE: 119 MMHG | DIASTOLIC BLOOD PRESSURE: 59 MMHG

## 2019-11-08 VITALS — SYSTOLIC BLOOD PRESSURE: 116 MMHG | DIASTOLIC BLOOD PRESSURE: 53 MMHG

## 2019-11-08 VITALS — SYSTOLIC BLOOD PRESSURE: 141 MMHG | DIASTOLIC BLOOD PRESSURE: 67 MMHG

## 2019-11-08 VITALS — SYSTOLIC BLOOD PRESSURE: 122 MMHG | DIASTOLIC BLOOD PRESSURE: 61 MMHG

## 2019-11-08 VITALS — SYSTOLIC BLOOD PRESSURE: 117 MMHG | DIASTOLIC BLOOD PRESSURE: 56 MMHG

## 2019-11-08 VITALS — SYSTOLIC BLOOD PRESSURE: 128 MMHG | DIASTOLIC BLOOD PRESSURE: 56 MMHG

## 2019-11-08 VITALS — DIASTOLIC BLOOD PRESSURE: 79 MMHG | SYSTOLIC BLOOD PRESSURE: 105 MMHG

## 2019-11-08 VITALS — SYSTOLIC BLOOD PRESSURE: 115 MMHG | DIASTOLIC BLOOD PRESSURE: 60 MMHG

## 2019-11-08 VITALS — SYSTOLIC BLOOD PRESSURE: 107 MMHG | DIASTOLIC BLOOD PRESSURE: 51 MMHG

## 2019-11-08 VITALS — DIASTOLIC BLOOD PRESSURE: 54 MMHG | SYSTOLIC BLOOD PRESSURE: 107 MMHG

## 2019-11-08 VITALS — SYSTOLIC BLOOD PRESSURE: 119 MMHG | DIASTOLIC BLOOD PRESSURE: 56 MMHG

## 2019-11-08 VITALS — SYSTOLIC BLOOD PRESSURE: 114 MMHG | DIASTOLIC BLOOD PRESSURE: 58 MMHG

## 2019-11-08 VITALS — SYSTOLIC BLOOD PRESSURE: 109 MMHG | DIASTOLIC BLOOD PRESSURE: 48 MMHG

## 2019-11-08 VITALS — DIASTOLIC BLOOD PRESSURE: 50 MMHG | SYSTOLIC BLOOD PRESSURE: 105 MMHG

## 2019-11-08 VITALS — DIASTOLIC BLOOD PRESSURE: 50 MMHG | SYSTOLIC BLOOD PRESSURE: 102 MMHG

## 2019-11-08 VITALS — SYSTOLIC BLOOD PRESSURE: 111 MMHG | DIASTOLIC BLOOD PRESSURE: 55 MMHG

## 2019-11-08 VITALS — DIASTOLIC BLOOD PRESSURE: 62 MMHG | SYSTOLIC BLOOD PRESSURE: 122 MMHG

## 2019-11-08 VITALS — SYSTOLIC BLOOD PRESSURE: 123 MMHG | DIASTOLIC BLOOD PRESSURE: 58 MMHG

## 2019-11-08 VITALS — DIASTOLIC BLOOD PRESSURE: 52 MMHG | SYSTOLIC BLOOD PRESSURE: 114 MMHG

## 2019-11-08 VITALS — DIASTOLIC BLOOD PRESSURE: 59 MMHG | SYSTOLIC BLOOD PRESSURE: 116 MMHG

## 2019-11-08 VITALS — DIASTOLIC BLOOD PRESSURE: 53 MMHG | SYSTOLIC BLOOD PRESSURE: 115 MMHG

## 2019-11-08 VITALS — SYSTOLIC BLOOD PRESSURE: 109 MMHG | DIASTOLIC BLOOD PRESSURE: 52 MMHG

## 2019-11-08 VITALS — DIASTOLIC BLOOD PRESSURE: 62 MMHG | SYSTOLIC BLOOD PRESSURE: 123 MMHG

## 2019-11-08 LAB
BASOPHILS NFR BLD AUTO: 0.3 % (ref 0–5)
BNP SERPL-MCNC: 119 PG/ML (ref 0–100)
BUN SERPL-MCNC: 37 MG/DL (ref 7–18)
CHLORIDE SERPL-SCNC: 113 MMOL/L (ref 101–111)
CO2 SERPL-SCNC: 20 MMOL/L (ref 21–32)
CREAT SERPL-MCNC: 2.5 MG/DL (ref 0.5–1.5)
EOSINOPHIL NFR BLD AUTO: 0.5 % (ref 0–8)
ERYTHROCYTE [DISTWIDTH] IN BLOOD BY AUTOMATED COUNT: 15.8 % (ref 11–15.5)
GFR SERPL CREATININE-BSD FRML MDRD: 26 ML/MIN (ref 60–?)
GLUCOSE SERPL-MCNC: 130 MG/DL (ref 70–105)
HCT VFR BLD AUTO: 26 % (ref 42–54)
LYMPHOCYTES NFR SPEC AUTO: 10.3 % (ref 21–51)
MCH RBC QN AUTO: 29.2 PG (ref 27–33)
MCHC RBC AUTO-ENTMCNC: 33.5 G/DL (ref 32–36)
MCV RBC AUTO: 87.2 FL (ref 79–99)
MONOCYTES NFR BLD AUTO: 11.1 % (ref 3–13)
NEUTROPHILS NFR BLD AUTO: 77.8 % (ref 40–77)
NRBC BLD MANUAL-RTO: 0 % (ref 0–0.19)
PLAT MORPH BLD: (no result)
PLATELET # BLD AUTO: 118 K/UL (ref 130–400)
POTASSIUM SERPL-SCNC: 3.9 MMOL/L (ref 3.5–5.1)
RBC # BLD AUTO: 2.98 MIL/UL (ref 4.5–6.2)
SODIUM SERPL-SCNC: 145 MMOL/L (ref 136–145)
WBC # BLD AUTO: 11.1 K/UL (ref 4.8–10.8)

## 2019-11-08 RX ADMIN — Medication PRN MG: at 02:37

## 2019-11-08 RX ADMIN — IPRATROPIUM BROMIDE AND ALBUTEROL SULFATE SCH UDVIAL: .5; 3 SOLUTION RESPIRATORY (INHALATION) at 18:18

## 2019-11-08 RX ADMIN — IPRATROPIUM BROMIDE AND ALBUTEROL SULFATE SCH UDVIAL: .5; 3 SOLUTION RESPIRATORY (INHALATION) at 11:10

## 2019-11-08 RX ADMIN — MORPHINE SULFATE PRN MG: 2 INJECTION, SOLUTION INTRAMUSCULAR; INTRAVENOUS at 19:35

## 2019-11-08 RX ADMIN — Medication PRN MG: at 13:03

## 2019-11-08 RX ADMIN — IPRATROPIUM BROMIDE AND ALBUTEROL SULFATE SCH UDVIAL: .5; 3 SOLUTION RESPIRATORY (INHALATION) at 06:27

## 2019-11-08 RX ADMIN — Medication PRN MG: at 21:32

## 2019-11-08 RX ADMIN — SODIUM CHLORIDE SCH MLS/HR: 9 INJECTION, SOLUTION INTRAVENOUS at 22:17

## 2019-11-08 RX ADMIN — Medication PRN MG: at 07:33

## 2019-11-08 RX ADMIN — IPRATROPIUM BROMIDE AND ALBUTEROL SULFATE SCH UDVIAL: .5; 3 SOLUTION RESPIRATORY (INHALATION) at 23:33

## 2019-11-08 RX ADMIN — FAMOTIDINE SCH MG: 10 INJECTION INTRAVENOUS at 07:32

## 2019-11-08 RX ADMIN — SODIUM CHLORIDE SCH MLS/HR: 9 INJECTION, SOLUTION INTRAVENOUS at 13:05

## 2019-11-08 NOTE — NUR
PALLIATIVE CARE



Sw present when Dr Springer met with pt's wife and 3 children. Dr Springer explained pt's 
current condition, and options. Dr Springer encouraged family to wait for biopsy result and 
see what Oncologist recommends. Dr Springer explained DNR and DNI and highly recommended 
family complete paperwork because neither would be beneficial for pt. Family states they 
know pt would not want feeding tube or treatment. Dr Springer encouraged family to be honest 
with pt regarding his condition and let pt decided what route he wants to take, and honor 
pt's decision. Family very appreciative for conversation and time Dr Springer spent 
explaining everything.

## 2019-11-09 VITALS — DIASTOLIC BLOOD PRESSURE: 60 MMHG | SYSTOLIC BLOOD PRESSURE: 122 MMHG

## 2019-11-09 VITALS — SYSTOLIC BLOOD PRESSURE: 152 MMHG | DIASTOLIC BLOOD PRESSURE: 95 MMHG

## 2019-11-09 VITALS — SYSTOLIC BLOOD PRESSURE: 110 MMHG | DIASTOLIC BLOOD PRESSURE: 55 MMHG

## 2019-11-09 VITALS — SYSTOLIC BLOOD PRESSURE: 103 MMHG | DIASTOLIC BLOOD PRESSURE: 45 MMHG

## 2019-11-09 VITALS — DIASTOLIC BLOOD PRESSURE: 67 MMHG | SYSTOLIC BLOOD PRESSURE: 121 MMHG

## 2019-11-09 VITALS — DIASTOLIC BLOOD PRESSURE: 71 MMHG | SYSTOLIC BLOOD PRESSURE: 147 MMHG

## 2019-11-09 VITALS — SYSTOLIC BLOOD PRESSURE: 113 MMHG | DIASTOLIC BLOOD PRESSURE: 54 MMHG

## 2019-11-09 VITALS — DIASTOLIC BLOOD PRESSURE: 56 MMHG | SYSTOLIC BLOOD PRESSURE: 117 MMHG

## 2019-11-09 VITALS — DIASTOLIC BLOOD PRESSURE: 94 MMHG | SYSTOLIC BLOOD PRESSURE: 165 MMHG

## 2019-11-09 VITALS — SYSTOLIC BLOOD PRESSURE: 128 MMHG | DIASTOLIC BLOOD PRESSURE: 65 MMHG

## 2019-11-09 VITALS — DIASTOLIC BLOOD PRESSURE: 74 MMHG | SYSTOLIC BLOOD PRESSURE: 151 MMHG

## 2019-11-09 VITALS — DIASTOLIC BLOOD PRESSURE: 73 MMHG | SYSTOLIC BLOOD PRESSURE: 140 MMHG

## 2019-11-09 VITALS — SYSTOLIC BLOOD PRESSURE: 131 MMHG | DIASTOLIC BLOOD PRESSURE: 67 MMHG

## 2019-11-09 VITALS — SYSTOLIC BLOOD PRESSURE: 113 MMHG | DIASTOLIC BLOOD PRESSURE: 56 MMHG

## 2019-11-09 VITALS — SYSTOLIC BLOOD PRESSURE: 118 MMHG | DIASTOLIC BLOOD PRESSURE: 56 MMHG

## 2019-11-09 VITALS — SYSTOLIC BLOOD PRESSURE: 125 MMHG | DIASTOLIC BLOOD PRESSURE: 59 MMHG

## 2019-11-09 VITALS — DIASTOLIC BLOOD PRESSURE: 55 MMHG | SYSTOLIC BLOOD PRESSURE: 112 MMHG

## 2019-11-09 VITALS — DIASTOLIC BLOOD PRESSURE: 63 MMHG | SYSTOLIC BLOOD PRESSURE: 121 MMHG

## 2019-11-09 VITALS — SYSTOLIC BLOOD PRESSURE: 151 MMHG | DIASTOLIC BLOOD PRESSURE: 73 MMHG

## 2019-11-09 VITALS — SYSTOLIC BLOOD PRESSURE: 117 MMHG | DIASTOLIC BLOOD PRESSURE: 64 MMHG

## 2019-11-09 VITALS — SYSTOLIC BLOOD PRESSURE: 101 MMHG | DIASTOLIC BLOOD PRESSURE: 54 MMHG

## 2019-11-09 VITALS — SYSTOLIC BLOOD PRESSURE: 119 MMHG | DIASTOLIC BLOOD PRESSURE: 59 MMHG

## 2019-11-09 VITALS — DIASTOLIC BLOOD PRESSURE: 43 MMHG | SYSTOLIC BLOOD PRESSURE: 116 MMHG

## 2019-11-09 VITALS — SYSTOLIC BLOOD PRESSURE: 117 MMHG | DIASTOLIC BLOOD PRESSURE: 62 MMHG

## 2019-11-09 LAB
BASE EXCESS BLDA CALC-SCNC: -7.7 MMOL/L (ref -2–3)
BASOPHILS NFR BLD AUTO: 0.2 % (ref 0–5)
BUN SERPL-MCNC: 41 MG/DL (ref 7–18)
CHLORIDE SERPL-SCNC: 113 MMOL/L (ref 101–111)
CO2 SERPL-SCNC: 18 MMOL/L (ref 21–32)
CREAT SERPL-MCNC: 3.1 MG/DL (ref 0.5–1.5)
EOSINOPHIL NFR BLD AUTO: 0.9 % (ref 0–8)
ERYTHROCYTE [DISTWIDTH] IN BLOOD BY AUTOMATED COUNT: 15.8 % (ref 11–15.5)
GFR SERPL CREATININE-BSD FRML MDRD: 20 ML/MIN (ref 60–?)
GLUCOSE SERPL-MCNC: 100 MG/DL (ref 70–105)
HCO3 BLDA-SCNC: 16.6 MMOL/L (ref 21–28)
HCT VFR BLD AUTO: 25.9 % (ref 42–54)
LYMPHOCYTES NFR SPEC AUTO: 13.2 % (ref 21–51)
MAGNESIUM SERPL-MCNC: 2.3 MG/DL (ref 1.8–2.4)
MCH RBC QN AUTO: 28.8 PG (ref 27–33)
MCHC RBC AUTO-ENTMCNC: 33.6 G/DL (ref 32–36)
MCV RBC AUTO: 85.8 FL (ref 79–99)
MONOCYTES NFR BLD AUTO: 12.9 % (ref 3–13)
NEUTROPHILS NFR BLD AUTO: 72.8 % (ref 40–77)
NRBC BLD MANUAL-RTO: 0 % (ref 0–0.19)
PCO2 BLDA: 31 MMHG (ref 35–48)
PLATELET # BLD AUTO: 142 K/UL (ref 130–400)
POTASSIUM SERPL-SCNC: 3.9 MMOL/L (ref 3.5–5.1)
RBC # BLD AUTO: 3.02 MIL/UL (ref 4.5–6.2)
SAO2 % BLDA: 98.2 % (ref 95–99)
SODIUM SERPL-SCNC: 143 MMOL/L (ref 136–145)
WBC # BLD AUTO: 10.4 K/UL (ref 4.8–10.8)

## 2019-11-09 PROCEDURE — 5A09357 ASSISTANCE WITH RESPIRATORY VENTILATION, LESS THAN 24 CONSECUTIVE HOURS, CONTINUOUS POSITIVE AIRWAY PRESSURE: ICD-10-PCS | Performed by: INTERNAL MEDICINE

## 2019-11-09 RX ADMIN — METOPROLOL TARTRATE SCH MG: 1 INJECTION, SOLUTION INTRAVENOUS at 19:08

## 2019-11-09 RX ADMIN — IPRATROPIUM BROMIDE AND ALBUTEROL SULFATE SCH UDVIAL: .5; 3 SOLUTION RESPIRATORY (INHALATION) at 06:24

## 2019-11-09 RX ADMIN — IPRATROPIUM BROMIDE AND ALBUTEROL SULFATE SCH UDVIAL: .5; 3 SOLUTION RESPIRATORY (INHALATION) at 11:31

## 2019-11-09 RX ADMIN — DILTIAZEM HYDROCHLORIDE PRN MLS/HR: 5 INJECTION, SOLUTION INTRAVENOUS at 21:34

## 2019-11-09 RX ADMIN — IPRATROPIUM BROMIDE AND ALBUTEROL SULFATE SCH UDVIAL: .5; 3 SOLUTION RESPIRATORY (INHALATION) at 18:10

## 2019-11-09 RX ADMIN — Medication PRN MG: at 05:47

## 2019-11-09 RX ADMIN — FAMOTIDINE SCH MG: 10 INJECTION INTRAVENOUS at 10:58

## 2019-11-09 RX ADMIN — Medication PRN MG: at 02:03

## 2019-11-09 RX ADMIN — IPRATROPIUM BROMIDE AND ALBUTEROL SULFATE SCH UDVIAL: .5; 3 SOLUTION RESPIRATORY (INHALATION) at 23:18

## 2019-11-09 RX ADMIN — SODIUM CHLORIDE SCH MLS/HR: 9 INJECTION, SOLUTION INTRAVENOUS at 21:40

## 2019-11-09 RX ADMIN — METOPROLOL TARTRATE SCH MG: 1 INJECTION, SOLUTION INTRAVENOUS at 12:46

## 2019-11-10 VITALS — DIASTOLIC BLOOD PRESSURE: 80 MMHG | SYSTOLIC BLOOD PRESSURE: 144 MMHG

## 2019-11-10 VITALS — DIASTOLIC BLOOD PRESSURE: 55 MMHG | SYSTOLIC BLOOD PRESSURE: 130 MMHG

## 2019-11-10 VITALS — SYSTOLIC BLOOD PRESSURE: 146 MMHG | DIASTOLIC BLOOD PRESSURE: 65 MMHG

## 2019-11-10 VITALS — DIASTOLIC BLOOD PRESSURE: 67 MMHG | SYSTOLIC BLOOD PRESSURE: 121 MMHG

## 2019-11-10 VITALS — SYSTOLIC BLOOD PRESSURE: 150 MMHG | DIASTOLIC BLOOD PRESSURE: 75 MMHG

## 2019-11-10 VITALS — DIASTOLIC BLOOD PRESSURE: 67 MMHG | SYSTOLIC BLOOD PRESSURE: 133 MMHG

## 2019-11-10 VITALS — SYSTOLIC BLOOD PRESSURE: 141 MMHG | DIASTOLIC BLOOD PRESSURE: 64 MMHG

## 2019-11-10 VITALS — DIASTOLIC BLOOD PRESSURE: 65 MMHG | SYSTOLIC BLOOD PRESSURE: 130 MMHG

## 2019-11-10 VITALS — SYSTOLIC BLOOD PRESSURE: 132 MMHG | DIASTOLIC BLOOD PRESSURE: 51 MMHG

## 2019-11-10 VITALS — SYSTOLIC BLOOD PRESSURE: 129 MMHG | DIASTOLIC BLOOD PRESSURE: 75 MMHG

## 2019-11-10 VITALS — DIASTOLIC BLOOD PRESSURE: 60 MMHG | SYSTOLIC BLOOD PRESSURE: 136 MMHG

## 2019-11-10 VITALS — DIASTOLIC BLOOD PRESSURE: 47 MMHG | SYSTOLIC BLOOD PRESSURE: 138 MMHG

## 2019-11-10 VITALS — DIASTOLIC BLOOD PRESSURE: 77 MMHG | SYSTOLIC BLOOD PRESSURE: 143 MMHG

## 2019-11-10 VITALS — SYSTOLIC BLOOD PRESSURE: 142 MMHG | DIASTOLIC BLOOD PRESSURE: 61 MMHG

## 2019-11-10 VITALS — SYSTOLIC BLOOD PRESSURE: 128 MMHG | DIASTOLIC BLOOD PRESSURE: 56 MMHG

## 2019-11-10 VITALS — SYSTOLIC BLOOD PRESSURE: 141 MMHG | DIASTOLIC BLOOD PRESSURE: 66 MMHG

## 2019-11-10 VITALS — DIASTOLIC BLOOD PRESSURE: 62 MMHG | SYSTOLIC BLOOD PRESSURE: 133 MMHG

## 2019-11-10 VITALS — SYSTOLIC BLOOD PRESSURE: 130 MMHG | DIASTOLIC BLOOD PRESSURE: 63 MMHG

## 2019-11-10 VITALS — SYSTOLIC BLOOD PRESSURE: 135 MMHG | DIASTOLIC BLOOD PRESSURE: 67 MMHG

## 2019-11-10 VITALS — SYSTOLIC BLOOD PRESSURE: 128 MMHG | DIASTOLIC BLOOD PRESSURE: 62 MMHG

## 2019-11-10 VITALS — DIASTOLIC BLOOD PRESSURE: 50 MMHG | SYSTOLIC BLOOD PRESSURE: 133 MMHG

## 2019-11-10 VITALS — SYSTOLIC BLOOD PRESSURE: 134 MMHG | DIASTOLIC BLOOD PRESSURE: 62 MMHG

## 2019-11-10 VITALS — SYSTOLIC BLOOD PRESSURE: 129 MMHG | DIASTOLIC BLOOD PRESSURE: 52 MMHG

## 2019-11-10 VITALS — DIASTOLIC BLOOD PRESSURE: 59 MMHG | SYSTOLIC BLOOD PRESSURE: 128 MMHG

## 2019-11-10 LAB
ALBUMIN SERPL-MCNC: 1.9 G/DL (ref 3.5–5)
ALT SERPL-CCNC: 23 U/L (ref 12–78)
AST SERPL-CCNC: 66 U/L (ref 10–37)
BASOPHILS NFR BLD AUTO: 0.3 % (ref 0–5)
BILIRUB SERPL-MCNC: 1.8 MG/DL (ref 0.2–1)
BNP SERPL-MCNC: 1150 PG/ML (ref 0–100)
BUN SERPL-MCNC: 41 MG/DL (ref 7–18)
CHLORIDE SERPL-SCNC: 116 MMOL/L (ref 101–111)
CO2 SERPL-SCNC: 20 MMOL/L (ref 21–32)
CREAT SERPL-MCNC: 3 MG/DL (ref 0.5–1.5)
EOSINOPHIL NFR BLD AUTO: 0.2 % (ref 0–8)
ERYTHROCYTE [DISTWIDTH] IN BLOOD BY AUTOMATED COUNT: 15.6 % (ref 11–15.5)
GFR SERPL CREATININE-BSD FRML MDRD: 21 ML/MIN (ref 60–?)
GLUCOSE SERPL-MCNC: 104 MG/DL (ref 70–105)
HCT VFR BLD AUTO: 27.3 % (ref 42–54)
LYMPHOCYTES NFR SPEC AUTO: 7.9 % (ref 21–51)
MAGNESIUM SERPL-MCNC: 2.3 MG/DL (ref 1.8–2.4)
MCH RBC QN AUTO: 28.1 PG (ref 27–33)
MCHC RBC AUTO-ENTMCNC: 33 G/DL (ref 32–36)
MCV RBC AUTO: 85.3 FL (ref 79–99)
MONOCYTES NFR BLD AUTO: 14 % (ref 3–13)
NEUTROPHILS NFR BLD AUTO: 77.6 % (ref 40–77)
NRBC BLD MANUAL-RTO: 0 % (ref 0–0.19)
PLATELET # BLD AUTO: 173 K/UL (ref 130–400)
POTASSIUM SERPL-SCNC: 3.8 MMOL/L (ref 3.5–5.1)
PROT SERPL-MCNC: 5.4 G/DL (ref 6–8.3)
RBC # BLD AUTO: 3.2 MIL/UL (ref 4.5–6.2)
SODIUM SERPL-SCNC: 146 MMOL/L (ref 136–145)
WBC # BLD AUTO: 11 K/UL (ref 4.8–10.8)

## 2019-11-10 PROCEDURE — 5A09357 ASSISTANCE WITH RESPIRATORY VENTILATION, LESS THAN 24 CONSECUTIVE HOURS, CONTINUOUS POSITIVE AIRWAY PRESSURE: ICD-10-PCS | Performed by: INTERNAL MEDICINE

## 2019-11-10 RX ADMIN — SODIUM CHLORIDE SCH MLS/HR: 9 INJECTION, SOLUTION INTRAVENOUS at 23:39

## 2019-11-10 RX ADMIN — IPRATROPIUM BROMIDE AND ALBUTEROL SULFATE SCH UDVIAL: .5; 3 SOLUTION RESPIRATORY (INHALATION) at 18:52

## 2019-11-10 RX ADMIN — SODIUM CHLORIDE SCH UNIT: 9 INJECTION, SOLUTION INTRAVENOUS at 18:00

## 2019-11-10 RX ADMIN — DILTIAZEM HYDROCHLORIDE PRN MLS/HR: 5 INJECTION, SOLUTION INTRAVENOUS at 12:27

## 2019-11-10 RX ADMIN — IPRATROPIUM BROMIDE AND ALBUTEROL SULFATE SCH UDVIAL: .5; 3 SOLUTION RESPIRATORY (INHALATION) at 23:17

## 2019-11-10 RX ADMIN — SODIUM CHLORIDE SCH MLS/HR: 9 INJECTION, SOLUTION INTRAVENOUS at 12:29

## 2019-11-10 RX ADMIN — IPRATROPIUM BROMIDE AND ALBUTEROL SULFATE SCH UDVIAL: .5; 3 SOLUTION RESPIRATORY (INHALATION) at 11:29

## 2019-11-10 RX ADMIN — SODIUM CHLORIDE SCH UNIT: 9 INJECTION, SOLUTION INTRAVENOUS at 23:28

## 2019-11-10 RX ADMIN — METOPROLOL TARTRATE SCH MG: 1 INJECTION, SOLUTION INTRAVENOUS at 06:19

## 2019-11-10 RX ADMIN — METOPROLOL TARTRATE SCH MG: 1 INJECTION, SOLUTION INTRAVENOUS at 18:27

## 2019-11-10 RX ADMIN — DEXTROSE AND SODIUM CHLORIDE SCH MLS/HR: 5; .9 INJECTION, SOLUTION INTRAVENOUS at 14:20

## 2019-11-10 RX ADMIN — SODIUM CHLORIDE SCH UNIT: 9 INJECTION, SOLUTION INTRAVENOUS at 12:00

## 2019-11-10 RX ADMIN — IPRATROPIUM BROMIDE AND ALBUTEROL SULFATE SCH UDVIAL: .5; 3 SOLUTION RESPIRATORY (INHALATION) at 06:16

## 2019-11-10 RX ADMIN — METOPROLOL TARTRATE SCH MG: 1 INJECTION, SOLUTION INTRAVENOUS at 12:24

## 2019-11-10 RX ADMIN — FAMOTIDINE SCH MG: 10 INJECTION INTRAVENOUS at 09:35

## 2019-11-10 RX ADMIN — METOPROLOL TARTRATE SCH MG: 1 INJECTION, SOLUTION INTRAVENOUS at 00:13

## 2019-11-11 VITALS — SYSTOLIC BLOOD PRESSURE: 168 MMHG | DIASTOLIC BLOOD PRESSURE: 82 MMHG

## 2019-11-11 VITALS — SYSTOLIC BLOOD PRESSURE: 146 MMHG | DIASTOLIC BLOOD PRESSURE: 85 MMHG

## 2019-11-11 VITALS — DIASTOLIC BLOOD PRESSURE: 74 MMHG | SYSTOLIC BLOOD PRESSURE: 156 MMHG

## 2019-11-11 VITALS — SYSTOLIC BLOOD PRESSURE: 170 MMHG | DIASTOLIC BLOOD PRESSURE: 89 MMHG

## 2019-11-11 VITALS — SYSTOLIC BLOOD PRESSURE: 146 MMHG | DIASTOLIC BLOOD PRESSURE: 65 MMHG

## 2019-11-11 VITALS — SYSTOLIC BLOOD PRESSURE: 146 MMHG | DIASTOLIC BLOOD PRESSURE: 67 MMHG

## 2019-11-11 VITALS — SYSTOLIC BLOOD PRESSURE: 151 MMHG | DIASTOLIC BLOOD PRESSURE: 68 MMHG

## 2019-11-11 VITALS — SYSTOLIC BLOOD PRESSURE: 147 MMHG | DIASTOLIC BLOOD PRESSURE: 67 MMHG

## 2019-11-11 VITALS — DIASTOLIC BLOOD PRESSURE: 58 MMHG | SYSTOLIC BLOOD PRESSURE: 142 MMHG

## 2019-11-11 VITALS — SYSTOLIC BLOOD PRESSURE: 156 MMHG | DIASTOLIC BLOOD PRESSURE: 66 MMHG

## 2019-11-11 VITALS — SYSTOLIC BLOOD PRESSURE: 156 MMHG | DIASTOLIC BLOOD PRESSURE: 68 MMHG

## 2019-11-11 VITALS — SYSTOLIC BLOOD PRESSURE: 113 MMHG | DIASTOLIC BLOOD PRESSURE: 80 MMHG

## 2019-11-11 VITALS — DIASTOLIC BLOOD PRESSURE: 73 MMHG | SYSTOLIC BLOOD PRESSURE: 159 MMHG

## 2019-11-11 VITALS — SYSTOLIC BLOOD PRESSURE: 135 MMHG | DIASTOLIC BLOOD PRESSURE: 61 MMHG

## 2019-11-11 PROCEDURE — 5A09357 ASSISTANCE WITH RESPIRATORY VENTILATION, LESS THAN 24 CONSECUTIVE HOURS, CONTINUOUS POSITIVE AIRWAY PRESSURE: ICD-10-PCS | Performed by: INTERNAL MEDICINE

## 2019-11-11 RX ADMIN — METOPROLOL TARTRATE SCH MG: 1 INJECTION, SOLUTION INTRAVENOUS at 01:16

## 2019-11-11 RX ADMIN — DILTIAZEM HYDROCHLORIDE SCH MG: 60 TABLET, FILM COATED ORAL at 23:47

## 2019-11-11 RX ADMIN — PANTOPRAZOLE SODIUM SCH MG: 40 TABLET, DELAYED RELEASE ORAL at 09:35

## 2019-11-11 RX ADMIN — METOPROLOL TARTRATE SCH MG: 1 INJECTION, SOLUTION INTRAVENOUS at 19:37

## 2019-11-11 RX ADMIN — METOPROLOL TARTRATE SCH MG: 1 INJECTION, SOLUTION INTRAVENOUS at 13:57

## 2019-11-11 RX ADMIN — IPRATROPIUM BROMIDE AND ALBUTEROL SULFATE SCH UDVIAL: .5; 3 SOLUTION RESPIRATORY (INHALATION) at 23:32

## 2019-11-11 RX ADMIN — SODIUM CHLORIDE SCH UNIT: 9 INJECTION, SOLUTION INTRAVENOUS at 05:39

## 2019-11-11 RX ADMIN — IPRATROPIUM BROMIDE AND ALBUTEROL SULFATE SCH UDVIAL: .5; 3 SOLUTION RESPIRATORY (INHALATION) at 06:35

## 2019-11-11 RX ADMIN — SODIUM CHLORIDE SCH UNIT: 9 INJECTION, SOLUTION INTRAVENOUS at 12:00

## 2019-11-11 RX ADMIN — SODIUM CHLORIDE SCH UNIT: 9 INJECTION, SOLUTION INTRAVENOUS at 18:00

## 2019-11-11 RX ADMIN — FAMOTIDINE SCH MG: 10 INJECTION INTRAVENOUS at 08:18

## 2019-11-11 RX ADMIN — IPRATROPIUM BROMIDE AND ALBUTEROL SULFATE SCH UDVIAL: .5; 3 SOLUTION RESPIRATORY (INHALATION) at 18:07

## 2019-11-11 RX ADMIN — METOPROLOL TARTRATE SCH MG: 1 INJECTION, SOLUTION INTRAVENOUS at 06:13

## 2019-11-11 RX ADMIN — DILTIAZEM HYDROCHLORIDE PRN MLS/HR: 5 INJECTION, SOLUTION INTRAVENOUS at 03:32

## 2019-11-11 RX ADMIN — DILTIAZEM HYDROCHLORIDE SCH MG: 60 TABLET, FILM COATED ORAL at 19:36

## 2019-11-11 RX ADMIN — IPRATROPIUM BROMIDE AND ALBUTEROL SULFATE SCH UDVIAL: .5; 3 SOLUTION RESPIRATORY (INHALATION) at 11:53

## 2019-11-11 RX ADMIN — DEXTROSE AND SODIUM CHLORIDE SCH MLS/HR: 5; .9 INJECTION, SOLUTION INTRAVENOUS at 09:45

## 2019-11-11 NOTE — NUR
RD NOTIFICATION



Dx: Esophageal Impaction and Mass. Hx: HTN, CAD, Heart Failure, CKD III, COPD. Diet: Full 
liquids/ nectar thick, Ensure TID. PO intake 50% and has improving appetite. Pt unable to 
pass BM and is weak, possibly due to poor po. Weight changes are not significant as per 
family. Pt tolerating full liquids well at this time.



RD recommends continue current diet

Encourage plenty liquid intake daily

Monitor BM, PO intake and appetite

RD will monitor and f/u as needed

-------------------------------------------------------------------------------

Addendum: 11/11/19 at 1511 by OCTAVIO LUTZ RD

-------------------------------------------------------------------------------

Amended: Links added.

## 2019-11-11 NOTE — NUR
DYSPHAGIA EVAL COMPLETED. +S/S OF ASPIRATION WITH THIN LIQUIDS. RECOMMEND NECTAR-THICK 
LIQUIDS; PILLS WHOLE WITH LIQUIDS.  (MD CLEARED Pt FOR LIQUID INTAKE AT THIS TIME)



RECOMMENDATIONS: 

1. DYSPHAGIA THERAPY 3-5X WEEK TO INCREASE ORAL MOTOR STRENGTH AND PHARYNGEAL SWALLOW: 

LTG#1: Pt WILL TOLERATE LEAST RESTRICTIVE DIET TO MEET NUTRITION/HYDRATION WITH NO S/S OF 
ASPIRATION. 

LTG#2: SKILLED EDUCATION Pt/FAMILY/STAFF

STG#1: Pt WILL PARTICIPATE IN LARYNGEAL ELEVATION/EXCURSION EXERCISES WITH 80% ACCURACY. 

STG#2: Pt WILL PARTICIPATE IN TONGUE BASE RETRACTION EXERCISES WITH 80% ACCURACY.

STG#3: Pt WILL PARTICIPATE IN ORAL MOTOR EXERCISES WITH 80% ACCURACY.

STG#4: Pt WILL TOLERATE NECTAR-THICK LIQUID DIET WITH NO OVERT S/S OF ASPIRATION. 

STG#5: Pt WILL PARTICIPATE IN RE-EVAL WHEN CLEARED TO INTAKE SOLIDS. 

STG#6: SKILLED EDUCATION Pt/FAMILY/STAFF.





SLP EDUCATED Pt AND FAMILY ON RESULTS AND RECOMMENDATIONS. SLP EDUCATED FAMILY ON ASPIRATION 
RISKS AND CONSEQUENCES. ALL QUESTIONS ANSWERED AT THIS TIME. 

-------------------------------------------------------------------------------

Addendum: 11/11/19 at 1251 by CLAIR KOTHARI, SPT ST

-------------------------------------------------------------------------------

Amended: Links added.

## 2019-11-11 NOTE — NUR
SKIN

BRUISING TO RIGHT INNER/LOWER THIGH

-------------------------------------------------------------------------------

Addendum: 11/11/19 at 0104 by JIM WALLS RN RN

-------------------------------------------------------------------------------

Amended: Links added.

## 2019-11-11 NOTE — NUR
BATH

CHG BATH GIVEN

-------------------------------------------------------------------------------

Addendum: 11/11/19 at 0457 by JIM WALLS RN RN

-------------------------------------------------------------------------------

Amended: Links added.

## 2019-11-12 VITALS — SYSTOLIC BLOOD PRESSURE: 155 MMHG | DIASTOLIC BLOOD PRESSURE: 98 MMHG

## 2019-11-12 VITALS — DIASTOLIC BLOOD PRESSURE: 83 MMHG | SYSTOLIC BLOOD PRESSURE: 169 MMHG

## 2019-11-12 VITALS — SYSTOLIC BLOOD PRESSURE: 160 MMHG | DIASTOLIC BLOOD PRESSURE: 73 MMHG

## 2019-11-12 VITALS — DIASTOLIC BLOOD PRESSURE: 78 MMHG | SYSTOLIC BLOOD PRESSURE: 156 MMHG

## 2019-11-12 VITALS — DIASTOLIC BLOOD PRESSURE: 71 MMHG | SYSTOLIC BLOOD PRESSURE: 124 MMHG

## 2019-11-12 RX ADMIN — DILTIAZEM HYDROCHLORIDE SCH MG: 60 TABLET, FILM COATED ORAL at 12:20

## 2019-11-12 RX ADMIN — METOPROLOL TARTRATE SCH MG: 1 INJECTION, SOLUTION INTRAVENOUS at 01:04

## 2019-11-12 RX ADMIN — IPRATROPIUM BROMIDE AND ALBUTEROL SULFATE SCH UDVIAL: .5; 3 SOLUTION RESPIRATORY (INHALATION) at 05:19

## 2019-11-12 RX ADMIN — DEXTROSE AND SODIUM CHLORIDE SCH MLS/HR: 5; .9 INJECTION, SOLUTION INTRAVENOUS at 12:21

## 2019-11-12 RX ADMIN — IPRATROPIUM BROMIDE AND ALBUTEROL SULFATE SCH UDVIAL: .5; 3 SOLUTION RESPIRATORY (INHALATION) at 18:50

## 2019-11-12 RX ADMIN — METOPROLOL TARTRATE SCH MG: 1 INJECTION, SOLUTION INTRAVENOUS at 06:58

## 2019-11-12 RX ADMIN — IPRATROPIUM BROMIDE AND ALBUTEROL SULFATE SCH UDVIAL: .5; 3 SOLUTION RESPIRATORY (INHALATION) at 12:58

## 2019-11-12 RX ADMIN — SODIUM CHLORIDE SCH UNIT: 9 INJECTION, SOLUTION INTRAVENOUS at 12:00

## 2019-11-12 RX ADMIN — SODIUM CHLORIDE SCH UNIT: 9 INJECTION, SOLUTION INTRAVENOUS at 06:00

## 2019-11-12 RX ADMIN — DILTIAZEM HYDROCHLORIDE SCH MG: 60 TABLET, FILM COATED ORAL at 06:57

## 2019-11-12 RX ADMIN — SODIUM CHLORIDE SCH UNIT: 9 INJECTION, SOLUTION INTRAVENOUS at 18:00

## 2019-11-12 RX ADMIN — IPRATROPIUM BROMIDE AND ALBUTEROL SULFATE SCH UDVIAL: .5; 3 SOLUTION RESPIRATORY (INHALATION) at 23:16

## 2019-11-12 RX ADMIN — PANTOPRAZOLE SODIUM SCH MG: 40 TABLET, DELAYED RELEASE ORAL at 06:57

## 2019-11-12 RX ADMIN — DILTIAZEM HYDROCHLORIDE SCH MG: 60 TABLET, FILM COATED ORAL at 18:36

## 2019-11-12 RX ADMIN — Medication SCH MG: at 19:56

## 2019-11-12 RX ADMIN — SODIUM CHLORIDE SCH UNIT: 9 INJECTION, SOLUTION INTRAVENOUS at 00:00

## 2019-11-12 RX ADMIN — METOPROLOL TARTRATE SCH MG: 1 INJECTION, SOLUTION INTRAVENOUS at 12:19

## 2019-11-12 NOTE — NUR
TREATMENT COMPLETED. 



Pt SEATED AT 90 DECREES IN BED WITH SON AT BEDSIDE. Pt COOPERATIVE AT THIS TIME. Pt WITH 
IMPROVED VOCAL QUALITY AND INTENSITY. Pt PARTICIPATED IN THERAPEUTIC TRIALS OF PUREED AND 
THIN LIQUIDS X10 WITH NO OVERT S/S OF ASPIRATION. Pt CURRENTLY ON FULL LIQUIDS, NECTAR-THICK 
LIQUID DIET. RECOMMEND CONTINUED THERAPEUTIC TRIALS AT THIS TIME. WILL ATTEMPT ADVANCED 
TEXTURES IF AGREED ON BY MD. SLP WILL CONTINUE TO FOLLOW Pt. SLP EDUCATED SON AT BEDSIDE ON 
THICKENING AND ASPIRATION PRECAUTIONS. 

-------------------------------------------------------------------------------

Addendum: 11/13/19 at 0936 by CLAIR KOTHARI [facility] ST

-------------------------------------------------------------------------------

Amended: Links added.

## 2019-11-13 VITALS — SYSTOLIC BLOOD PRESSURE: 168 MMHG | DIASTOLIC BLOOD PRESSURE: 80 MMHG

## 2019-11-13 VITALS — DIASTOLIC BLOOD PRESSURE: 49 MMHG | SYSTOLIC BLOOD PRESSURE: 122 MMHG

## 2019-11-13 VITALS — SYSTOLIC BLOOD PRESSURE: 145 MMHG | DIASTOLIC BLOOD PRESSURE: 75 MMHG

## 2019-11-13 VITALS — SYSTOLIC BLOOD PRESSURE: 139 MMHG | DIASTOLIC BLOOD PRESSURE: 66 MMHG

## 2019-11-13 VITALS — DIASTOLIC BLOOD PRESSURE: 69 MMHG | SYSTOLIC BLOOD PRESSURE: 131 MMHG

## 2019-11-13 VITALS — DIASTOLIC BLOOD PRESSURE: 87 MMHG | SYSTOLIC BLOOD PRESSURE: 137 MMHG

## 2019-11-13 VITALS — DIASTOLIC BLOOD PRESSURE: 88 MMHG | SYSTOLIC BLOOD PRESSURE: 120 MMHG

## 2019-11-13 LAB
BASOPHILS NFR BLD AUTO: 0.4 % (ref 0–5)
BUN SERPL-MCNC: 38 MG/DL (ref 7–18)
CHLORIDE SERPL-SCNC: 117 MMOL/L (ref 101–111)
CO2 SERPL-SCNC: 21 MMOL/L (ref 21–32)
CREAT SERPL-MCNC: 1.5 MG/DL (ref 0.5–1.5)
EOSINOPHIL NFR BLD AUTO: 0.5 % (ref 0–8)
ERYTHROCYTE [DISTWIDTH] IN BLOOD BY AUTOMATED COUNT: 15.4 % (ref 11–15.5)
GFR SERPL CREATININE-BSD FRML MDRD: 47 ML/MIN (ref 60–?)
GLUCOSE SERPL-MCNC: 99 MG/DL (ref 70–105)
HCT VFR BLD AUTO: 28.3 % (ref 42–54)
LYMPHOCYTES NFR SPEC AUTO: 11.7 % (ref 21–51)
MCH RBC QN AUTO: 28.9 PG (ref 27–33)
MCHC RBC AUTO-ENTMCNC: 34 G/DL (ref 32–36)
MCV RBC AUTO: 85 FL (ref 79–99)
MONOCYTES NFR BLD AUTO: 9.6 % (ref 3–13)
NEUTROPHILS NFR BLD AUTO: 77.8 % (ref 40–77)
NRBC BLD MANUAL-RTO: 0 % (ref 0–0.19)
PLATELET # BLD AUTO: 216 K/UL (ref 130–400)
POTASSIUM SERPL-SCNC: 3.5 MMOL/L (ref 3.5–5.1)
RBC # BLD AUTO: 3.33 MIL/UL (ref 4.5–6.2)
SODIUM SERPL-SCNC: 150 MMOL/L (ref 136–145)
WBC # BLD AUTO: 14.4 K/UL (ref 4.8–10.8)

## 2019-11-13 RX ADMIN — SODIUM CHLORIDE SCH UNIT: 9 INJECTION, SOLUTION INTRAVENOUS at 16:30

## 2019-11-13 RX ADMIN — IPRATROPIUM BROMIDE AND ALBUTEROL SULFATE SCH UDVIAL: .5; 3 SOLUTION RESPIRATORY (INHALATION) at 11:01

## 2019-11-13 RX ADMIN — Medication SCH MG: at 08:49

## 2019-11-13 RX ADMIN — SODIUM CHLORIDE SCH UNIT: 9 INJECTION, SOLUTION INTRAVENOUS at 21:00

## 2019-11-13 RX ADMIN — IPRATROPIUM BROMIDE AND ALBUTEROL SULFATE SCH UDVIAL: .5; 3 SOLUTION RESPIRATORY (INHALATION) at 23:03

## 2019-11-13 RX ADMIN — DILTIAZEM HYDROCHLORIDE SCH MG: 60 TABLET, FILM COATED ORAL at 17:01

## 2019-11-13 RX ADMIN — Medication SCH MG: at 17:01

## 2019-11-13 RX ADMIN — SODIUM CHLORIDE SCH UNIT: 9 INJECTION, SOLUTION INTRAVENOUS at 07:30

## 2019-11-13 RX ADMIN — Medication SCH GM: at 08:49

## 2019-11-13 RX ADMIN — DILTIAZEM HYDROCHLORIDE SCH MG: 60 TABLET, FILM COATED ORAL at 06:00

## 2019-11-13 RX ADMIN — DILTIAZEM HYDROCHLORIDE SCH MG: 60 TABLET, FILM COATED ORAL at 00:37

## 2019-11-13 RX ADMIN — IPRATROPIUM BROMIDE AND ALBUTEROL SULFATE SCH UDVIAL: .5; 3 SOLUTION RESPIRATORY (INHALATION) at 18:16

## 2019-11-13 RX ADMIN — DEXTROSE AND SODIUM CHLORIDE SCH MLS/HR: 5; .9 INJECTION, SOLUTION INTRAVENOUS at 01:45

## 2019-11-13 RX ADMIN — PANTOPRAZOLE SODIUM SCH MG: 40 TABLET, DELAYED RELEASE ORAL at 05:58

## 2019-11-13 RX ADMIN — HALOPERIDOL LACTATE PRN MG: 5 INJECTION INTRAMUSCULAR at 18:21

## 2019-11-13 RX ADMIN — SODIUM CHLORIDE SCH UNIT: 9 INJECTION, SOLUTION INTRAVENOUS at 11:30

## 2019-11-13 RX ADMIN — DEXTROSE AND SODIUM CHLORIDE SCH MLS/HR: 5; .9 INJECTION, SOLUTION INTRAVENOUS at 21:45

## 2019-11-13 RX ADMIN — DILTIAZEM HYDROCHLORIDE SCH MG: 60 TABLET, FILM COATED ORAL at 12:46

## 2019-11-13 RX ADMIN — Medication SCH MG: at 21:00

## 2019-11-13 RX ADMIN — HYDROMORPHONE HYDROCHLORIDE PRN MG: 1 INJECTION, SOLUTION INTRAMUSCULAR; INTRAVENOUS; SUBCUTANEOUS at 22:00

## 2019-11-13 NOTE — NUR
JONNYP



Zora recd call from [place], who has met with family. Family to provide Medicare # to 
[place].

Family wanting [facility] first  and 2nd choice is [facility].

Sw spoke to Karma at [facility] they can not accept pt with hospice.

Zora spoke to Dorie and informed family to surinder.

## 2019-11-13 NOTE — NUR
HOSPICE



Sw met with pt and family. Pt yelling at family wants water, refusing it with  Family very 
anxious about discharge and meeting pt's care needs at home. Discussed hospice at home, in 
NH and hospice house. FAmily can not manage at home, pt is not bed bound and can not go to a 
hospice house, NH is only option. Pt agreeable to [facility] referral anf will meet with 
Angelita after noon. Family to tour [facility] and decided where they want referral 
sent. 

CM aware

-------------------------------------------------------------------------------

Addendum: 11/13/19 at 1256 by ERIKA GONZALEZ

-------------------------------------------------------------------------------

Pt refusing water [place] thick. Pt getting aggressive and frustrated with family.

## 2019-11-13 NOTE — NUR
CM Note: EMS filled out pending to be faxed

EMS semi-filled out, pending to enter receiving facility name, current date, and faxed. 
Primary nurse given instruction to add current date, receiving facility, and fax to [facility] 
number on cover sheet prior to calling, if pt has everything arranged late today. CM to cont 
to follow up.

## 2019-11-14 VITALS — DIASTOLIC BLOOD PRESSURE: 76 MMHG | SYSTOLIC BLOOD PRESSURE: 122 MMHG

## 2019-11-14 VITALS — SYSTOLIC BLOOD PRESSURE: 137 MMHG | DIASTOLIC BLOOD PRESSURE: 91 MMHG

## 2019-11-14 VITALS — DIASTOLIC BLOOD PRESSURE: 80 MMHG | SYSTOLIC BLOOD PRESSURE: 149 MMHG

## 2019-11-14 VITALS — DIASTOLIC BLOOD PRESSURE: 79 MMHG | SYSTOLIC BLOOD PRESSURE: 143 MMHG

## 2019-11-14 RX ADMIN — HYDROMORPHONE HYDROCHLORIDE PRN MG: 1 INJECTION, SOLUTION INTRAMUSCULAR; INTRAVENOUS; SUBCUTANEOUS at 21:23

## 2019-11-14 RX ADMIN — DILTIAZEM HYDROCHLORIDE SCH MG: 60 TABLET, FILM COATED ORAL at 06:00

## 2019-11-14 RX ADMIN — Medication SCH MG: at 14:23

## 2019-11-14 RX ADMIN — IPRATROPIUM BROMIDE AND ALBUTEROL SULFATE SCH UDVIAL: .5; 3 SOLUTION RESPIRATORY (INHALATION) at 18:52

## 2019-11-14 RX ADMIN — DILTIAZEM HYDROCHLORIDE SCH MG: 60 TABLET, FILM COATED ORAL at 11:03

## 2019-11-14 RX ADMIN — DILTIAZEM HYDROCHLORIDE SCH MG: 60 TABLET, FILM COATED ORAL at 21:15

## 2019-11-14 RX ADMIN — IPRATROPIUM BROMIDE AND ALBUTEROL SULFATE SCH UDVIAL: .5; 3 SOLUTION RESPIRATORY (INHALATION) at 23:23

## 2019-11-14 RX ADMIN — SODIUM CHLORIDE SCH UNIT: 9 INJECTION, SOLUTION INTRAVENOUS at 11:30

## 2019-11-14 RX ADMIN — SODIUM CHLORIDE SCH UNIT: 9 INJECTION, SOLUTION INTRAVENOUS at 16:30

## 2019-11-14 RX ADMIN — SODIUM CHLORIDE SCH UNIT: 9 INJECTION, SOLUTION INTRAVENOUS at 07:30

## 2019-11-14 RX ADMIN — Medication SCH GM: at 09:00

## 2019-11-14 RX ADMIN — Medication SCH MG: at 21:15

## 2019-11-14 RX ADMIN — PANTOPRAZOLE SODIUM SCH MG: 40 TABLET, DELAYED RELEASE ORAL at 11:03

## 2019-11-14 RX ADMIN — IPRATROPIUM BROMIDE AND ALBUTEROL SULFATE SCH UDVIAL: .5; 3 SOLUTION RESPIRATORY (INHALATION) at 10:51

## 2019-11-14 RX ADMIN — DILTIAZEM HYDROCHLORIDE SCH MG: 60 TABLET, FILM COATED ORAL at 00:00

## 2019-11-14 RX ADMIN — SODIUM CHLORIDE SCH UNIT: 9 INJECTION, SOLUTION INTRAVENOUS at 21:00

## 2019-11-14 RX ADMIN — IPRATROPIUM BROMIDE AND ALBUTEROL SULFATE SCH UDVIAL: .5; 3 SOLUTION RESPIRATORY (INHALATION) at 06:00

## 2019-11-14 RX ADMIN — Medication SCH MG: at 11:02

## 2019-11-14 NOTE — NUR
[facility] DENIED



SW recd call from Dorie at [facility]. Pt does not meet medical criteria for NH placement.

## 2019-11-14 NOTE — NUR
TREATMENT COMPLETED. 



Pt SEATED AT 90 DECREES IN BED WITH SON AT BEDSIDE. Pt COOPERATIVE AT THIS TIME. Pt WITH 
IMPROVED VOCAL QUALITY AND INTENSITY. Pt PARTICIPATED IN THERAPEUTIC TRIALS OF THIN LIQUIDS 
X10 WITH NO OVERT S/S OF ASPIRATION. Pt CURRENTLY ON FULL LIQUIDS, NECTAR-THICK LIQUID DIET. 
RECOMMEND DIET UPGRADE TO THIN LIQUIDS. Pt WITH NO OVERT S/S OF ASPIRATION FOR 2 CONSECUTIVE 
SESSION. RECOMMEND Pt REMAIN UPRIGHT 30 MINUTES AFTER THE MEAL AND SIT 90 DEGREES IN BED. 
SON VERBALIZED AGREEMENT AND COMPLIANCE WITH RECOMMENDATIONS.  SLP WILL CONTINUE TO FOLLOW 
Pt. 

-------------------------------------------------------------------------------

Addendum: 11/14/19 at 1217 by CLAIR KOTHARI [facility] ST

-------------------------------------------------------------------------------

Amended: Links added.

## 2019-11-14 NOTE — NUR
DAUGHTER IS REFUSING VITAL SIGNS, SHE STATES SHE WANTS HER FATHER TO SLEEP 

SINCE HE WAS BEEN RESTLESS DURING THE DAY AND THE PERVIOUS DAYS AS WELL. 

SHE DOES NOT WANT ANY ONE TO GO IN THE ROOM , NOR ANY MEDICATIONS ORALLY TO BE ADMINISTERED 
TO THE PATIENT 

SHE STATES , HE CANNOT SWALLOW PILLS, NOT EVEN CRUSHED, AND DOES NOT WANT ANY CRUSHED ORAL 
MEDICATIONS 

PATIENT CURRENTLY SLEEPING CALMLY

## 2019-11-14 NOTE — NUR
[facility] f/u



Zora spoke to [facility] who states son Sabino did come at tour facility. Son called several times 
with questions and stated that he would bring needed paperwork to [facility] for financial 
approval. Son never did.



Sw spoke to Angelita. son Lester states family wants to see other NH before deciding. They 
will have answer today.

## 2019-11-14 NOTE — NUR
HOSPICE VS SNF



Sw staffed dc options with CM. Pt does meet criteria for SNF if they decided to prolong 
admission to hospice at this time. Sw met with pt's son Lester and informed of denials and 
educated on SNF criteria.

SW informed that if goal is for long term placement, omly NH options are at [facility] and 
[facility], who are in network with [facility]. Son wants to tour [facility] and will recontact with 
decision.

CM informed of above

## 2019-11-14 NOTE — NUR
Patient's son stated he had a a liquid bowel movement last night and again this morning, 
requested MIralax to be held.

## 2019-11-15 VITALS — DIASTOLIC BLOOD PRESSURE: 75 MMHG | SYSTOLIC BLOOD PRESSURE: 155 MMHG

## 2019-11-15 VITALS — SYSTOLIC BLOOD PRESSURE: 135 MMHG | DIASTOLIC BLOOD PRESSURE: 58 MMHG

## 2019-11-15 VITALS — DIASTOLIC BLOOD PRESSURE: 68 MMHG | SYSTOLIC BLOOD PRESSURE: 119 MMHG

## 2019-11-15 VITALS — DIASTOLIC BLOOD PRESSURE: 67 MMHG | SYSTOLIC BLOOD PRESSURE: 127 MMHG

## 2019-11-15 VITALS — SYSTOLIC BLOOD PRESSURE: 138 MMHG | DIASTOLIC BLOOD PRESSURE: 71 MMHG

## 2019-11-15 VITALS — SYSTOLIC BLOOD PRESSURE: 117 MMHG | DIASTOLIC BLOOD PRESSURE: 73 MMHG

## 2019-11-15 VITALS — SYSTOLIC BLOOD PRESSURE: 138 MMHG | DIASTOLIC BLOOD PRESSURE: 87 MMHG

## 2019-11-15 RX ADMIN — DEXTROSE AND SODIUM CHLORIDE SCH MLS/HR: 5; .9 INJECTION, SOLUTION INTRAVENOUS at 13:45

## 2019-11-15 RX ADMIN — DILTIAZEM HYDROCHLORIDE SCH MG: 60 TABLET, FILM COATED ORAL at 12:17

## 2019-11-15 RX ADMIN — SODIUM CHLORIDE SCH UNIT: 9 INJECTION, SOLUTION INTRAVENOUS at 20:44

## 2019-11-15 RX ADMIN — Medication SCH MG: at 08:24

## 2019-11-15 RX ADMIN — SODIUM CHLORIDE SCH UNIT: 9 INJECTION, SOLUTION INTRAVENOUS at 11:30

## 2019-11-15 RX ADMIN — IPRATROPIUM BROMIDE AND ALBUTEROL SULFATE SCH UDVIAL: .5; 3 SOLUTION RESPIRATORY (INHALATION) at 23:57

## 2019-11-15 RX ADMIN — DILTIAZEM HYDROCHLORIDE SCH MG: 60 TABLET, FILM COATED ORAL at 00:00

## 2019-11-15 RX ADMIN — Medication SCH MG: at 20:39

## 2019-11-15 RX ADMIN — DILTIAZEM HYDROCHLORIDE SCH MG: 60 TABLET, FILM COATED ORAL at 18:00

## 2019-11-15 RX ADMIN — HYDROMORPHONE HYDROCHLORIDE PRN MG: 1 INJECTION, SOLUTION INTRAMUSCULAR; INTRAVENOUS; SUBCUTANEOUS at 08:33

## 2019-11-15 RX ADMIN — IPRATROPIUM BROMIDE AND ALBUTEROL SULFATE SCH UDVIAL: .5; 3 SOLUTION RESPIRATORY (INHALATION) at 11:47

## 2019-11-15 RX ADMIN — IPRATROPIUM BROMIDE AND ALBUTEROL SULFATE SCH UDVIAL: .5; 3 SOLUTION RESPIRATORY (INHALATION) at 06:43

## 2019-11-15 RX ADMIN — DILTIAZEM HYDROCHLORIDE SCH MG: 60 TABLET, FILM COATED ORAL at 05:59

## 2019-11-15 RX ADMIN — HYDROMORPHONE HYDROCHLORIDE PRN MG: 1 INJECTION, SOLUTION INTRAMUSCULAR; INTRAVENOUS; SUBCUTANEOUS at 21:39

## 2019-11-15 RX ADMIN — PANTOPRAZOLE SODIUM SCH MG: 40 TABLET, DELAYED RELEASE ORAL at 08:24

## 2019-11-15 RX ADMIN — DEXTROSE AND SODIUM CHLORIDE SCH MLS/HR: 5; .9 INJECTION, SOLUTION INTRAVENOUS at 13:52

## 2019-11-15 RX ADMIN — Medication SCH MG: at 15:43

## 2019-11-15 RX ADMIN — HYDROMORPHONE HYDROCHLORIDE PRN MG: 1 INJECTION, SOLUTION INTRAMUSCULAR; INTRAVENOUS; SUBCUTANEOUS at 15:44

## 2019-11-15 RX ADMIN — SODIUM CHLORIDE SCH UNIT: 9 INJECTION, SOLUTION INTRAVENOUS at 06:55

## 2019-11-15 RX ADMIN — IPRATROPIUM BROMIDE AND ALBUTEROL SULFATE SCH UDVIAL: .5; 3 SOLUTION RESPIRATORY (INHALATION) at 20:26

## 2019-11-15 RX ADMIN — HALOPERIDOL LACTATE PRN MG: 5 INJECTION INTRAMUSCULAR at 00:00

## 2019-11-15 RX ADMIN — Medication SCH GM: at 08:33

## 2019-11-15 RX ADMIN — SODIUM CHLORIDE SCH UNIT: 9 INJECTION, SOLUTION INTRAVENOUS at 16:30

## 2019-11-15 NOTE — NUR
Called Lester Springer, patient's son to notify him of [facility] working on getting 
order for medication. Mr. Springer reported that Jamal Ramon, Director of med surg department 
spoke with family and told family that they could delay discharge until AM. Per Jamal Ramon, 
spouse was informed that discharge was pending for the DME equipment to be delivered. 
Notified by Juaquin of [facility] that morphine and Ativan included in Hospice care 
package to provide pain management but was concerned about patient's family reluctance to 
discharge with hospice if unwilling to receive patient at home. Per Juaquin with [facility], not able to admit with [facility] tonight. [facility] on call notified of 
events and order to hold discharge to 11/16/19 AM received by María night shift RN.

## 2019-11-15 NOTE — NUR
NO TREATMENT



TREATMENT NOT COMPLETED AT THIS TIME SECONDARY TO Pt BEING RESTLESS LAST NIGHT AND NOT ABLE 
TO SLEEP. NURSE TR REPORTS Pt WAS JUST ABLE TO GET SOME REST. AS PER NURSE, Pt HAS BEEN 
TOLERATING DIET UPGRADE OF THIN LIQUIDS. SLP WILL CONTINUE TO FOLLOW Pt. 

-------------------------------------------------------------------------------

Addendum: 11/15/19 at 1224 by CLAIR KOTHARI, [facility] ST

-------------------------------------------------------------------------------

Amended: Links added.

## 2019-11-15 NOTE — NUR
Provided report to Ursula Frazier RN of [facility]. Reported to Ursula that patient is 
more relaxed after Dilaudid administration. Per Ursula, she will visit family in morning and 
make sure Dilaudid is included in his medications. Received call from Lester Springer, son of 
patient who expressed concerns for unsafe discharge to home. States that patient will be 
restless and that it may be a matter of life or death if patient is sent home without 
Dilaudid. Reported to son that vital signs have been stable, patient is in no distress and 
order from physician is to proceed with discharge to home with [facility]. Called Dr. Mabel Ibraihm informing her of patient's family request to delay discharge. Per Dr. Lazaro Ibrahim, states no need to delay discharge and recommended [facility] be contacted 
to see if they can provide Dilaudid for patient. Left message with [facility] office to 
page Ursula Frazier RN.

## 2019-11-15 NOTE — NUR
DCP: HOME WITH [facility] [phone]



Zora informed son that pt has been denied for SNF at [facility]. Explained to son that only option 
at this time is home or home with hospice. ZORA answered all questions asked. Informed son 
that Angelita from [facility] with be contacting him to make arrangements for DME to be 
delivered.

Zora recd call from Angelita. Son wanting dc help to weekend. Informed Angelita we have order 
and pt must dc today. Son requesting to pay out of pocket for pt. Angelita referred son to 
[facility] financial counselors. Angelita has ordered DME for delivery tonight. 

CM and LAKESHA aware of above

## 2019-11-15 NOTE — NUR
Tano recd call from Dr Springer. Son Lester called Dr Springer to tell him that pt was being 
discharged with nothing arranged. Son concerned that discharge was not appropriate at this 
time. Dr Springer explained that hospice would have all DME at home before pt arrived and 
would assist with teaching on admission. 

TANO informed Dr Springer that [facility] was working on getting DME to home and pt to dc 
tonmena.

Angelita at [place] informed of above

## 2019-11-15 NOTE — NUR
CM Note: [facility] pending ins auth

As per Dianne FREEDMAN pt family agreeable for [facility] for skilled nursing first and then will 
transition to hospice after. CM faxed order, clinicals, PT and [facility] to [facility], confirmation 
received. Spoke to [facility], will come eval pt. Pt pending acceptance and ins auth. EMS 
arranged and faxed for today, primary nurse to call [facility] once pt ready to DC. Primary nurse 
aware. CM to cont to follow up.

## 2019-11-15 NOTE — NUR
RD FOLLOW UP



DIET: FULL LIQUIDS, ENSURE TID. PO INTAKE 50% AND HAS DECLINED APPETITE. PT TOLERATING MEALS 
WELL PER FAMILY. PENDING D/C TO SNF, PT ON HOSPICE. PT UNABLE TO TOLERATE HIS FAVORITE FOODS 
ANYMORE SUCH AS SALSA PER FAMILY. RD ENCOURAGED GI SOFT/BLAND AND MECHANICAL SOFT FOODS IF 
DIET HAPPENS TO ADVANCE AT ANY POINT IN TIME. PT DOES NOT SELF FEED; HE NEEDS ASSISTANCE AT 
ALL MEALS PER FAMILY. PT IS TOLERATING/ DRINKING ENSURE DAILY. LBM: 11/14; DIARRHEA NOTED. 
SKIN INTACT, NO EDEMA NOTED. 



RD RECOMMENDS CONTINUE FULL LIQUIDS AND ENSURE TID

ADVANCE DIET AS TOLERATED WHEN MEDICALLY FEASIBLE TO GI SOFT/BLAND AND MECHANICAL SOFT/ FINE 
CHOPPED

ASSISTED FEEDINGS WITH ALL MEALS

CONTINUE ENSURE TID



RD WILL FOLLOW UP AS NEEDED, THANK YOU.

-------------------------------------------------------------------------------

Addendum: 11/15/19 at 120[address] by [address]

-------------------------------------------------------------------------------

Amended: Links added.

## 2019-11-15 NOTE — NUR
GIANNI Note: [facility] denial

CM spoke to Zule[facility]. Pt has denial, unable to meet pt needs. Son and family aware. 
Undecided what to do at this time. Dianne FREEDMAN updated. Will try to do HNR if son agreeable. See 
TANO notes. Primary nurse aware. CM to cont to follow up.

## 2019-11-15 NOTE — NUR
DCP: SNF at [facility]



Sw met with wife, daughter and spoke to son LESTER on phone. Informed family that we needed 
decision on NH of choice. Wife upset, states it is too soon to dc pt. "Why are you rushing 
his discharge". Reminded wife that I have had dc order to dc when arrangements made, and 
been talking to them for days about dcp and given them time to tour facilities and decided. 
Wife complaining that doctors have not been showing up to see pt and that she wants pt 
transferred to [facility] for better MD response. Explained to wife that we are not 
doing anything medically for pt that can not be done at NH. TANO asked Lester what was decided 
and he stated that they would like pt to go to [facility] for SNF. Informed him and family that 
referral would be sent and we may have acceptance today. If accepted pt to transfer today. 
If not, may be Monday.

[facility]ita [facility]icedo [place] informed of above

## 2019-11-15 NOTE — NUR
Son stated patient had a few bowel movements yesterday and this morning, requested to hold 
MIralax.

## 2019-11-15 NOTE — NUR
Per Robina Ruiz of [facility], will try to get medications ordered and will call back to 
let us know if able to do so. Per Elza, case management, stated that medications should 
be ordered by [facility] in order for patient to be discharged. Provided report to EULOGIO Mon RN for continuity of care.

## 2019-11-16 VITALS — SYSTOLIC BLOOD PRESSURE: 113 MMHG | DIASTOLIC BLOOD PRESSURE: 71 MMHG

## 2019-11-16 VITALS — SYSTOLIC BLOOD PRESSURE: 115 MMHG | DIASTOLIC BLOOD PRESSURE: 56 MMHG

## 2019-11-16 RX ADMIN — HYDROMORPHONE HYDROCHLORIDE PRN MG: 1 INJECTION, SOLUTION INTRAMUSCULAR; INTRAVENOUS; SUBCUTANEOUS at 08:06

## 2019-11-16 RX ADMIN — DILTIAZEM HYDROCHLORIDE SCH MG: 60 TABLET, FILM COATED ORAL at 00:06

## 2019-11-16 RX ADMIN — SODIUM CHLORIDE SCH UNIT: 9 INJECTION, SOLUTION INTRAVENOUS at 06:40

## 2019-11-16 RX ADMIN — PANTOPRAZOLE SODIUM SCH MG: 40 TABLET, DELAYED RELEASE ORAL at 06:19

## 2019-11-16 RX ADMIN — IPRATROPIUM BROMIDE AND ALBUTEROL SULFATE SCH UDVIAL: .5; 3 SOLUTION RESPIRATORY (INHALATION) at 07:11

## 2019-11-16 RX ADMIN — DILTIAZEM HYDROCHLORIDE SCH MG: 60 TABLET, FILM COATED ORAL at 06:19

## 2019-11-16 NOTE — NUR
Discharge instructions provided to Lester Springer, son of patient. Explained that Hospice 
will review medications and educate family on care. Mr. Springer aware that he needs to call 
[place] with [facility] when they arrive home. Discharge paper signed. EMS already 
contacted to  patient.

## 2019-11-16 NOTE — NUR
Called [facility] to notify of EMS transfer set up Left message with answering service. 
 EMS contacted to  patient. PCS sent to EMS with new  date.